# Patient Record
Sex: MALE | Race: WHITE | Employment: OTHER | URBAN - METROPOLITAN AREA
[De-identification: names, ages, dates, MRNs, and addresses within clinical notes are randomized per-mention and may not be internally consistent; named-entity substitution may affect disease eponyms.]

---

## 2017-04-20 ENCOUNTER — HOSPITAL ENCOUNTER (OUTPATIENT)
Dept: SURGERY | Age: 82
Discharge: HOME | End: 2017-04-20
Attending: INTERNAL MEDICINE | Admitting: INTERNAL MEDICINE

## 2017-04-20 VITALS
HEART RATE: 60 BPM | HEIGHT: 70 IN | RESPIRATION RATE: 18 BRPM | WEIGHT: 145 LBS | DIASTOLIC BLOOD PRESSURE: 63 MMHG | SYSTOLIC BLOOD PRESSURE: 134 MMHG | BODY MASS INDEX: 20.76 KG/M2 | OXYGEN SATURATION: 98 % | TEMPERATURE: 97.3 F

## 2017-04-20 RX ORDER — OXYCODONE HYDROCHLORIDE AND ACETAMINOPHEN 5; 325 MG/1; MG/1
1 TABLET ORAL PRN
Status: ACTIVE | OUTPATIENT
Start: 2017-04-20 | End: 2017-04-20

## 2017-04-20 RX ORDER — LIDOCAINE HYDROCHLORIDE 10 MG/ML
0.1 INJECTION, SOLUTION EPIDURAL; INFILTRATION; INTRACAUDAL; PERINEURAL
Status: ACTIVE | OUTPATIENT
Start: 2017-04-20 | End: 2017-04-20

## 2017-04-20 RX ORDER — ONDANSETRON 2 MG/ML
4 INJECTION INTRAMUSCULAR; INTRAVENOUS
Status: ACTIVE | OUTPATIENT
Start: 2017-04-20 | End: 2017-04-20

## 2017-04-20 RX ORDER — LABETALOL HYDROCHLORIDE 5 MG/ML
5 INJECTION, SOLUTION INTRAVENOUS EVERY 10 MIN PRN
Status: DISCONTINUED | OUTPATIENT
Start: 2017-04-20 | End: 2017-04-21 | Stop reason: HOSPADM

## 2017-04-20 RX ORDER — LANOLIN ALCOHOL/MO/W.PET/CERES
3 CREAM (GRAM) TOPICAL DAILY
COMMUNITY
End: 2018-08-03 | Stop reason: ALTCHOICE

## 2017-04-20 RX ORDER — MORPHINE SULFATE 2 MG/ML
1 INJECTION, SOLUTION INTRAMUSCULAR; INTRAVENOUS EVERY 5 MIN PRN
Status: DISCONTINUED | OUTPATIENT
Start: 2017-04-20 | End: 2017-04-21 | Stop reason: HOSPADM

## 2017-04-20 RX ORDER — MORPHINE SULFATE 10 MG/ML
2 INJECTION, SOLUTION INTRAMUSCULAR; INTRAVENOUS EVERY 5 MIN PRN
Status: DISCONTINUED | OUTPATIENT
Start: 2017-04-20 | End: 2017-04-21 | Stop reason: HOSPADM

## 2017-04-20 RX ORDER — PROMETHAZINE HYDROCHLORIDE 25 MG/ML
6.25 INJECTION, SOLUTION INTRAMUSCULAR; INTRAVENOUS
Status: ACTIVE | OUTPATIENT
Start: 2017-04-20 | End: 2017-04-20

## 2017-04-20 RX ORDER — HYDRALAZINE HYDROCHLORIDE 20 MG/ML
5 INJECTION INTRAMUSCULAR; INTRAVENOUS EVERY 10 MIN PRN
Status: DISCONTINUED | OUTPATIENT
Start: 2017-04-20 | End: 2017-04-21 | Stop reason: HOSPADM

## 2017-04-20 RX ORDER — MEPERIDINE HYDROCHLORIDE 50 MG/ML
12.5 INJECTION INTRAMUSCULAR; INTRAVENOUS; SUBCUTANEOUS EVERY 5 MIN PRN
Status: DISCONTINUED | OUTPATIENT
Start: 2017-04-20 | End: 2017-04-21 | Stop reason: HOSPADM

## 2017-04-20 RX ORDER — OXYCODONE HYDROCHLORIDE AND ACETAMINOPHEN 5; 325 MG/1; MG/1
2 TABLET ORAL PRN
Status: ACTIVE | OUTPATIENT
Start: 2017-04-20 | End: 2017-04-20

## 2017-04-20 RX ORDER — SODIUM CHLORIDE, SODIUM LACTATE, POTASSIUM CHLORIDE, CALCIUM CHLORIDE 600; 310; 30; 20 MG/100ML; MG/100ML; MG/100ML; MG/100ML
INJECTION, SOLUTION INTRAVENOUS ONCE
Status: COMPLETED | OUTPATIENT
Start: 2017-04-20 | End: 2017-04-20

## 2017-04-20 RX ORDER — FINASTERIDE 5 MG/1
5 TABLET, FILM COATED ORAL DAILY
Status: ON HOLD | COMMUNITY
End: 2021-03-03 | Stop reason: HOSPADM

## 2017-04-20 RX ORDER — SODIUM CHLORIDE, SODIUM LACTATE, POTASSIUM CHLORIDE, CALCIUM CHLORIDE 600; 310; 30; 20 MG/100ML; MG/100ML; MG/100ML; MG/100ML
INJECTION, SOLUTION INTRAVENOUS CONTINUOUS
Status: CANCELLED | OUTPATIENT
Start: 2017-04-20

## 2017-04-20 RX ORDER — DIPHENHYDRAMINE HYDROCHLORIDE 50 MG/ML
12.5 INJECTION INTRAMUSCULAR; INTRAVENOUS
Status: ACTIVE | OUTPATIENT
Start: 2017-04-20 | End: 2017-04-20

## 2017-04-20 RX ADMIN — SODIUM CHLORIDE, SODIUM LACTATE, POTASSIUM CHLORIDE, CALCIUM CHLORIDE: 600; 310; 30; 20 INJECTION, SOLUTION INTRAVENOUS at 09:58

## 2017-04-20 ASSESSMENT — PAIN SCALES - GENERAL
PAINLEVEL_OUTOF10: 0
PAINLEVEL_OUTOF10: 0

## 2017-04-20 ASSESSMENT — PAIN - FUNCTIONAL ASSESSMENT: PAIN_FUNCTIONAL_ASSESSMENT: 0-10

## 2017-05-25 ENCOUNTER — HOSPITAL ENCOUNTER (OUTPATIENT)
Dept: PHYSICAL THERAPY | Age: 82
Discharge: HOME | End: 2017-05-31
Admitting: INTERNAL MEDICINE

## 2018-08-03 ENCOUNTER — HOSPITAL ENCOUNTER (INPATIENT)
Age: 83
LOS: 4 days | Discharge: HOME | DRG: 189 | End: 2018-08-07
Attending: EMERGENCY MEDICINE | Admitting: FAMILY MEDICINE
Payer: MEDICARE

## 2018-08-03 ENCOUNTER — APPOINTMENT (OUTPATIENT)
Dept: GENERAL RADIOLOGY | Age: 83
DRG: 189 | End: 2018-08-03
Payer: MEDICARE

## 2018-08-03 ENCOUNTER — APPOINTMENT (OUTPATIENT)
Dept: CT IMAGING | Age: 83
DRG: 189 | End: 2018-08-03
Payer: MEDICARE

## 2018-08-03 DIAGNOSIS — J44.1 COPD EXACERBATION (HCC): ICD-10-CM

## 2018-08-03 DIAGNOSIS — J40 BRONCHITIS: ICD-10-CM

## 2018-08-03 DIAGNOSIS — R09.02 HYPOXIA: Primary | ICD-10-CM

## 2018-08-03 LAB
A/G RATIO: 1.4 (ref 1.1–2.2)
ALBUMIN SERPL-MCNC: 3.9 G/DL (ref 3.4–5)
ALP BLD-CCNC: 74 U/L (ref 40–129)
ALT SERPL-CCNC: 19 U/L (ref 10–40)
ANION GAP SERPL CALCULATED.3IONS-SCNC: 13 MMOL/L (ref 3–16)
AST SERPL-CCNC: 23 U/L (ref 15–37)
BASOPHILS ABSOLUTE: 0.1 K/UL (ref 0–0.2)
BASOPHILS RELATIVE PERCENT: 0.4 %
BILIRUB SERPL-MCNC: 3.3 MG/DL (ref 0–1)
BUN BLDV-MCNC: 16 MG/DL (ref 7–20)
CALCIUM SERPL-MCNC: 9.3 MG/DL (ref 8.3–10.6)
CHLORIDE BLD-SCNC: 101 MMOL/L (ref 99–110)
CO2: 22 MMOL/L (ref 21–32)
CREAT SERPL-MCNC: 1.1 MG/DL (ref 0.8–1.3)
EOSINOPHILS ABSOLUTE: 0.1 K/UL (ref 0–0.6)
EOSINOPHILS RELATIVE PERCENT: 0.4 %
GFR AFRICAN AMERICAN: >60
GFR NON-AFRICAN AMERICAN: >60
GLOBULIN: 2.8 G/DL
GLUCOSE BLD-MCNC: 149 MG/DL (ref 70–99)
HCT VFR BLD CALC: 43.7 % (ref 40.5–52.5)
HEMOGLOBIN: 15 G/DL (ref 13.5–17.5)
LACTIC ACID: 2.1 MMOL/L (ref 0.4–2)
LYMPHOCYTES ABSOLUTE: 0.5 K/UL (ref 1–5.1)
LYMPHOCYTES RELATIVE PERCENT: 3.6 %
MCH RBC QN AUTO: 34.2 PG (ref 26–34)
MCHC RBC AUTO-ENTMCNC: 34.3 G/DL (ref 31–36)
MCV RBC AUTO: 99.5 FL (ref 80–100)
MONOCYTES ABSOLUTE: 0.9 K/UL (ref 0–1.3)
MONOCYTES RELATIVE PERCENT: 6.6 %
NEUTROPHILS ABSOLUTE: 12.5 K/UL (ref 1.7–7.7)
NEUTROPHILS RELATIVE PERCENT: 89 %
PDW BLD-RTO: 14.1 % (ref 12.4–15.4)
PLATELET # BLD: 181 K/UL (ref 135–450)
PMV BLD AUTO: 8.3 FL (ref 5–10.5)
POTASSIUM SERPL-SCNC: 3.7 MMOL/L (ref 3.5–5.1)
PRO-BNP: 1044 PG/ML (ref 0–449)
RBC # BLD: 4.4 M/UL (ref 4.2–5.9)
SODIUM BLD-SCNC: 136 MMOL/L (ref 136–145)
TOTAL PROTEIN: 6.7 G/DL (ref 6.4–8.2)
TROPONIN: <0.01 NG/ML
WBC # BLD: 14 K/UL (ref 4–11)

## 2018-08-03 PROCEDURE — 93010 ELECTROCARDIOGRAM REPORT: CPT | Performed by: INTERNAL MEDICINE

## 2018-08-03 PROCEDURE — 83880 ASSAY OF NATRIURETIC PEPTIDE: CPT

## 2018-08-03 PROCEDURE — 2580000003 HC RX 258: Performed by: FAMILY MEDICINE

## 2018-08-03 PROCEDURE — 71046 X-RAY EXAM CHEST 2 VIEWS: CPT

## 2018-08-03 PROCEDURE — 83605 ASSAY OF LACTIC ACID: CPT

## 2018-08-03 PROCEDURE — 6370000000 HC RX 637 (ALT 250 FOR IP): Performed by: EMERGENCY MEDICINE

## 2018-08-03 PROCEDURE — 6370000000 HC RX 637 (ALT 250 FOR IP): Performed by: FAMILY MEDICINE

## 2018-08-03 PROCEDURE — 85025 COMPLETE CBC W/AUTO DIFF WBC: CPT

## 2018-08-03 PROCEDURE — 1200000000 HC SEMI PRIVATE

## 2018-08-03 PROCEDURE — 94150 VITAL CAPACITY TEST: CPT

## 2018-08-03 PROCEDURE — 2700000000 HC OXYGEN THERAPY PER DAY

## 2018-08-03 PROCEDURE — 99285 EMERGENCY DEPT VISIT HI MDM: CPT

## 2018-08-03 PROCEDURE — 94664 DEMO&/EVAL PT USE INHALER: CPT

## 2018-08-03 PROCEDURE — 87040 BLOOD CULTURE FOR BACTERIA: CPT

## 2018-08-03 PROCEDURE — 94761 N-INVAS EAR/PLS OXIMETRY MLT: CPT

## 2018-08-03 PROCEDURE — 94640 AIRWAY INHALATION TREATMENT: CPT

## 2018-08-03 PROCEDURE — 71250 CT THORAX DX C-: CPT

## 2018-08-03 PROCEDURE — 93005 ELECTROCARDIOGRAM TRACING: CPT | Performed by: EMERGENCY MEDICINE

## 2018-08-03 PROCEDURE — 84484 ASSAY OF TROPONIN QUANT: CPT

## 2018-08-03 PROCEDURE — 80053 COMPREHEN METABOLIC PANEL: CPT

## 2018-08-03 RX ORDER — FOLIC ACID 1 MG/1
1 TABLET ORAL DAILY
Status: DISCONTINUED | OUTPATIENT
Start: 2018-08-03 | End: 2018-08-07 | Stop reason: HOSPADM

## 2018-08-03 RX ORDER — PREDNISONE 20 MG/1
40 TABLET ORAL 2 TIMES DAILY
Status: DISCONTINUED | OUTPATIENT
Start: 2018-08-03 | End: 2018-08-03

## 2018-08-03 RX ORDER — SODIUM CHLORIDE 0.9 % (FLUSH) 0.9 %
10 SYRINGE (ML) INJECTION PRN
Status: DISCONTINUED | OUTPATIENT
Start: 2018-08-03 | End: 2018-08-07 | Stop reason: HOSPADM

## 2018-08-03 RX ORDER — ONDANSETRON 2 MG/ML
4 INJECTION INTRAMUSCULAR; INTRAVENOUS EVERY 6 HOURS PRN
Status: DISCONTINUED | OUTPATIENT
Start: 2018-08-03 | End: 2018-08-07 | Stop reason: HOSPADM

## 2018-08-03 RX ORDER — PREDNISONE 20 MG/1
40 TABLET ORAL DAILY
Status: DISCONTINUED | OUTPATIENT
Start: 2018-08-03 | End: 2018-08-05

## 2018-08-03 RX ORDER — LEVOTHYROXINE SODIUM 0.05 MG/1
50 TABLET ORAL DAILY
Status: DISCONTINUED | OUTPATIENT
Start: 2018-08-03 | End: 2018-08-07 | Stop reason: HOSPADM

## 2018-08-03 RX ORDER — AZITHROMYCIN 250 MG/1
500 TABLET, FILM COATED ORAL DAILY
Status: ACTIVE | OUTPATIENT
Start: 2018-08-03 | End: 2018-08-04

## 2018-08-03 RX ORDER — PREDNISONE 20 MG/1
60 TABLET ORAL ONCE
Status: COMPLETED | OUTPATIENT
Start: 2018-08-03 | End: 2018-08-03

## 2018-08-03 RX ORDER — FINASTERIDE 5 MG/1
5 TABLET, FILM COATED ORAL DAILY
Status: DISCONTINUED | OUTPATIENT
Start: 2018-08-03 | End: 2018-08-07 | Stop reason: HOSPADM

## 2018-08-03 RX ORDER — SODIUM CHLORIDE 0.9 % (FLUSH) 0.9 %
10 SYRINGE (ML) INJECTION EVERY 12 HOURS SCHEDULED
Status: DISCONTINUED | OUTPATIENT
Start: 2018-08-03 | End: 2018-08-07 | Stop reason: HOSPADM

## 2018-08-03 RX ORDER — TAMSULOSIN HYDROCHLORIDE 0.4 MG/1
0.4 CAPSULE ORAL DAILY
Status: DISCONTINUED | OUTPATIENT
Start: 2018-08-03 | End: 2018-08-07 | Stop reason: HOSPADM

## 2018-08-03 RX ORDER — AZITHROMYCIN 250 MG/1
250 TABLET, FILM COATED ORAL DAILY
Status: DISCONTINUED | OUTPATIENT
Start: 2018-08-04 | End: 2018-08-05

## 2018-08-03 RX ORDER — PREDNISONE 20 MG/1
40 TABLET ORAL 2 TIMES DAILY
Status: DISCONTINUED | OUTPATIENT
Start: 2018-08-04 | End: 2018-08-03

## 2018-08-03 RX ORDER — MIRTAZAPINE 30 MG/1
30 TABLET, FILM COATED ORAL NIGHTLY
Status: ON HOLD | COMMUNITY
End: 2021-03-03 | Stop reason: HOSPADM

## 2018-08-03 RX ORDER — POLYETHYLENE GLYCOL 3350 17 G/17G
17 POWDER, FOR SOLUTION ORAL DAILY
Status: ON HOLD | COMMUNITY
End: 2021-03-03 | Stop reason: HOSPADM

## 2018-08-03 RX ORDER — POLYETHYLENE GLYCOL 3350 17 G/17G
17 POWDER, FOR SOLUTION ORAL DAILY
Status: DISCONTINUED | OUTPATIENT
Start: 2018-08-03 | End: 2018-08-05

## 2018-08-03 RX ORDER — IPRATROPIUM BROMIDE AND ALBUTEROL SULFATE 2.5; .5 MG/3ML; MG/3ML
1 SOLUTION RESPIRATORY (INHALATION) EVERY 4 HOURS
Status: DISCONTINUED | OUTPATIENT
Start: 2018-08-04 | End: 2018-08-04

## 2018-08-03 RX ORDER — AZITHROMYCIN 250 MG/1
500 TABLET, FILM COATED ORAL ONCE
Status: COMPLETED | OUTPATIENT
Start: 2018-08-03 | End: 2018-08-03

## 2018-08-03 RX ORDER — IPRATROPIUM BROMIDE AND ALBUTEROL SULFATE 2.5; .5 MG/3ML; MG/3ML
1 SOLUTION RESPIRATORY (INHALATION) EVERY 4 HOURS PRN
Status: DISCONTINUED | OUTPATIENT
Start: 2018-08-03 | End: 2018-08-03

## 2018-08-03 RX ORDER — SILODOSIN 8 MG/1
8 CAPSULE ORAL EVERY EVENING
COMMUNITY
End: 2021-02-26

## 2018-08-03 RX ORDER — IPRATROPIUM BROMIDE AND ALBUTEROL SULFATE 2.5; .5 MG/3ML; MG/3ML
1 SOLUTION RESPIRATORY (INHALATION)
Status: DISCONTINUED | OUTPATIENT
Start: 2018-08-03 | End: 2018-08-03

## 2018-08-03 RX ORDER — MIRTAZAPINE 15 MG/1
30 TABLET, FILM COATED ORAL NIGHTLY
Status: DISCONTINUED | OUTPATIENT
Start: 2018-08-03 | End: 2018-08-07 | Stop reason: HOSPADM

## 2018-08-03 RX ORDER — CHOLECALCIFEROL (VITAMIN D3) 125 MCG
5 CAPSULE ORAL NIGHTLY PRN
Status: DISCONTINUED | OUTPATIENT
Start: 2018-08-03 | End: 2018-08-07 | Stop reason: HOSPADM

## 2018-08-03 RX ADMIN — Medication 10 ML: at 20:49

## 2018-08-03 RX ADMIN — MIRTAZAPINE 30 MG: 15 TABLET, FILM COATED ORAL at 20:48

## 2018-08-03 RX ADMIN — TAMSULOSIN HYDROCHLORIDE 0.4 MG: 0.4 CAPSULE ORAL at 20:49

## 2018-08-03 RX ADMIN — IPRATROPIUM BROMIDE AND ALBUTEROL SULFATE 1 AMPULE: .5; 3 SOLUTION RESPIRATORY (INHALATION) at 20:01

## 2018-08-03 RX ADMIN — IPRATROPIUM BROMIDE AND ALBUTEROL SULFATE 1 AMPULE: .5; 3 SOLUTION RESPIRATORY (INHALATION) at 23:31

## 2018-08-03 RX ADMIN — AZITHROMYCIN 500 MG: 250 TABLET, FILM COATED ORAL at 18:12

## 2018-08-03 RX ADMIN — PREDNISONE 40 MG: 20 TABLET ORAL at 20:49

## 2018-08-03 RX ADMIN — PREDNISONE 60 MG: 20 TABLET ORAL at 18:12

## 2018-08-03 ASSESSMENT — ENCOUNTER SYMPTOMS
SHORTNESS OF BREATH: 1
ABDOMINAL PAIN: 0
NAUSEA: 0
CHEST TIGHTNESS: 0
BACK PAIN: 0
COUGH: 1

## 2018-08-03 NOTE — H&P
Hospital Medicine History & Physical      PCP: Kamila Orta MD    Date of Admission: 8/3/2018    Date of Service: Pt seen/examined on 8/3/18 and Admitted to Inpatient with expected LOS greater than two midnights due to medical therapy. Chief Complaint:  SOB    History Of Present Illness:    80 y.o. male who presented to [facility] with SOB. Patient notes that is become more short of breath over the past few days. He has a cough productive of sputum but difficulty expectorating. No fevers or chills. Increased weakness per daughter. She did not know that he had a diagnosis of emphysema or COPD. He notes he smoked for 20 year period but quit. He required oxygen in the ER upon arrival due to hypoxia. He is feeling a little better after a breathing treatment. He will be admitted for further care for a COPD exacerbation. Past Medical History:          Diagnosis Date    Decreased vision     left    Dysphagia     Enlarged prostate     Glaucoma     Macular degeneration     Thyroid disease     hypothyroid       Past Surgical History:          Procedure Laterality Date    BACK SURGERY      insertion of cement due to crushed vertebrae    EYE SURGERY Bilateral     cataracts    EYE SURGERY Right 6/13/14    [place]    TONSILLECTOMY      UPPER GASTROINTESTINAL ENDOSCOPY  04/20/2017    dilated esophagus       Medications Prior to Admission:      Prior to Admission medications    Medication Sig Start Date End Date Taking?  Authorizing Provider   polyethylene glycol (MIRALAX) powder Take 17 g by mouth daily   Yes Historical Provider, MD   mirtazapine (REMERON) 30 MG tablet Take 30 mg by mouth nightly   Yes Historical Provider, MD   silodosin (RAPAFLO) 8 MG CAPS Take 8 mg by mouth every evening   Yes Historical Provider, MD   finasteride (PROSCAR) 5 MG tablet Take 5 mg by mouth daily   Yes Historical Provider, MD   Cyanocobalamin (VITAMIN B 12 PO) Take by mouth   Yes Historical Provider, MD vitamin D (CHOLECALCIFEROL) 1000 UNIT TABS tablet Take 1,000 Units by mouth daily   Yes Historical Provider, MD   folic acid (FOLVITE) 1 MG tablet Take 1 mg by mouth daily. Yes Historical Provider, MD   Levothyroxine Sodium 50 MCG CAPS Take  by mouth. Yes Historical Provider, MD       Allergies:  Patient has no known allergies. Social History:      The patient currently lives at assisted living. TOBACCO:   reports that he quit smoking about 23 years ago. He has a 40.00 pack-year smoking history. He has never used smokeless tobacco.  ETOH:   reports that he drinks about 3.5 oz of alcohol per week . Family History:      Positive as follows:    Family History   Problem Relation Age of Onset   Tania Villagran Cancer Mother     Cancer Father        REVIEW OF SYSTEMS:   Pertinent positives as noted in the HPI. All other systems reviewed and negative. PHYSICAL EXAM PERFORMED:    /80   Pulse 88   Temp 98.2 °F (36.8 °C) (Oral)   Resp 18   Ht 5' 11\" (1.803 m)   Wt 155 lb (70.3 kg)   SpO2 92%   BMI 21.62 kg/m²     General appearance:  No apparent distress. HEENT:  Bilateral Blindness. Neck: Supple. Respiratory:  Decreased breath sounds at bases. Cardiovascular:  Regular rate and rhythm with normal S1/S2 without murmurs, rubs or gallops. Abdomen: Soft, non-tender, non-distended with normal bowel sounds. Musculoskeletal:  Generalized weakness. Skin: Skin color, texture, turgor normal.  No rashes or lesions. Neurologic:  Neurovascularly intact without any focal sensory/motor deficits.  Cranial nerves: II-XII intact, grossly non-focal.  Psychiatric:  Alert and oriented, thought content appropriate, normal insight  Capillary Refill: Brisk,< 3 seconds   Peripheral Pulses: +2 palpable, equal bilaterally       Labs:     Recent Labs      08/03/18   1640   WBC  14.0*   HGB  15.0   HCT  43.7   PLT  181     Recent Labs      08/03/18   1640   NA  136   K  3.7   CL  101   CO2  22   BUN  16   CREATININE  1.1 CALCIUM  9.3     Recent Labs      08/03/18   1640   AST  23   ALT  19   BILITOT  3.3*   ALKPHOS  74     No results for input(s): INR in the last 72 hours. Recent Labs      08/03/18   1640   TROPONINI  <0.01       Urinalysis:      Lab Results   Component Value Date    NITRU Negative 04/23/2016    BLOODU Negative 04/23/2016    SPECGRAV 1.015 04/23/2016    GLUCOSEU Negative 04/23/2016       Radiology:     CXR: I have reviewed the CXR with the following interpretation: neg  EKG:  I have reviewed the EKG with the following interpretation: bigeminy    CT CHEST WO CONTRAST   Final Result   Moderate emphysema and COPD with old granulomatous disease. No evident   pneumonia or congestive heart failure. Mild peribronchial wall thickening in all lung segments could represent acute   or chronic bronchitis versus mild bronchiectasis. Old 30% anterior wedge compression fracture near the thoracolumbar junction. XR CHEST STANDARD (2 VW)   Final Result   1. No acute abnormality. ASSESSMENT:    Active Hospital Problems    Diagnosis Date Noted    COPD exacerbation ([facility] [address] [J44.1] 08/03/2018     PLAN:    1. COPD exacerbation with hypoxia-pt not normally on oxygen but requiring 3 L in the ER. Notes cough productive of sputum. CT shows mod emphysema which is new to the patient. No pneumonia or edema. Started on steroids, breathing treatments, azithromycin. Pro calcitonin pending. 2.  BPHcontinue Proscar and Flomax. 3.  Hypothyroidismcontinue Synthroid. Stable. DVT Prophylaxis: lovenox  Diet: DIET GENERAL;  Code Status: Full Code    PT/OT Eval Status: baseline    Dispo - 2-3 days       Jia Glover MD    Thank you Maxime De Dios MD for the opportunity to be involved in this patient's care. If you have any questions or concerns please feel free to contact me at [phone].

## 2018-08-04 LAB
ANION GAP SERPL CALCULATED.3IONS-SCNC: 16 MMOL/L (ref 3–16)
BASOPHILS ABSOLUTE: 0 K/UL (ref 0–0.2)
BASOPHILS RELATIVE PERCENT: 0.2 %
BUN BLDV-MCNC: 21 MG/DL (ref 7–20)
CALCIUM SERPL-MCNC: 9.2 MG/DL (ref 8.3–10.6)
CHLORIDE BLD-SCNC: 104 MMOL/L (ref 99–110)
CO2: 19 MMOL/L (ref 21–32)
CREAT SERPL-MCNC: 1.2 MG/DL (ref 0.8–1.3)
EOSINOPHILS ABSOLUTE: 0 K/UL (ref 0–0.6)
EOSINOPHILS RELATIVE PERCENT: 0 %
GFR AFRICAN AMERICAN: >60
GFR NON-AFRICAN AMERICAN: 57
GLUCOSE BLD-MCNC: 207 MG/DL (ref 70–99)
HCT VFR BLD CALC: 41.9 % (ref 40.5–52.5)
HEMOGLOBIN: 14.3 G/DL (ref 13.5–17.5)
LYMPHOCYTES ABSOLUTE: 0.2 K/UL (ref 1–5.1)
LYMPHOCYTES RELATIVE PERCENT: 2.4 %
MAGNESIUM: 1.9 MG/DL (ref 1.8–2.4)
MCH RBC QN AUTO: 34.2 PG (ref 26–34)
MCHC RBC AUTO-ENTMCNC: 34.2 G/DL (ref 31–36)
MCV RBC AUTO: 100.1 FL (ref 80–100)
MONOCYTES ABSOLUTE: 0.2 K/UL (ref 0–1.3)
MONOCYTES RELATIVE PERCENT: 2 %
NEUTROPHILS ABSOLUTE: 8.4 K/UL (ref 1.7–7.7)
NEUTROPHILS RELATIVE PERCENT: 95.4 %
PDW BLD-RTO: 13.9 % (ref 12.4–15.4)
PLATELET # BLD: 163 K/UL (ref 135–450)
PMV BLD AUTO: 8.4 FL (ref 5–10.5)
POTASSIUM REFLEX MAGNESIUM: 3.3 MMOL/L (ref 3.5–5.1)
PROCALCITONIN: 0.25 NG/ML (ref 0–0.15)
RBC # BLD: 4.18 M/UL (ref 4.2–5.9)
SODIUM BLD-SCNC: 139 MMOL/L (ref 136–145)
WBC # BLD: 8.8 K/UL (ref 4–11)

## 2018-08-04 PROCEDURE — 85025 COMPLETE CBC W/AUTO DIFF WBC: CPT

## 2018-08-04 PROCEDURE — 6370000000 HC RX 637 (ALT 250 FOR IP): Performed by: FAMILY MEDICINE

## 2018-08-04 PROCEDURE — 51702 INSERT TEMP BLADDER CATH: CPT

## 2018-08-04 PROCEDURE — 94640 AIRWAY INHALATION TREATMENT: CPT

## 2018-08-04 PROCEDURE — 94761 N-INVAS EAR/PLS OXIMETRY MLT: CPT

## 2018-08-04 PROCEDURE — 51798 US URINE CAPACITY MEASURE: CPT

## 2018-08-04 PROCEDURE — 2580000003 HC RX 258: Performed by: FAMILY MEDICINE

## 2018-08-04 PROCEDURE — 1200000000 HC SEMI PRIVATE

## 2018-08-04 PROCEDURE — 80048 BASIC METABOLIC PNL TOTAL CA: CPT

## 2018-08-04 PROCEDURE — 6360000002 HC RX W HCPCS: Performed by: FAMILY MEDICINE

## 2018-08-04 PROCEDURE — 36415 COLL VENOUS BLD VENIPUNCTURE: CPT

## 2018-08-04 PROCEDURE — 2580000003 HC RX 258: Performed by: INTERNAL MEDICINE

## 2018-08-04 PROCEDURE — 2700000000 HC OXYGEN THERAPY PER DAY

## 2018-08-04 PROCEDURE — 83735 ASSAY OF MAGNESIUM: CPT

## 2018-08-04 PROCEDURE — 6370000000 HC RX 637 (ALT 250 FOR IP): Performed by: INTERNAL MEDICINE

## 2018-08-04 PROCEDURE — 84145 PROCALCITONIN (PCT): CPT

## 2018-08-04 RX ORDER — IPRATROPIUM BROMIDE AND ALBUTEROL SULFATE 2.5; .5 MG/3ML; MG/3ML
1 SOLUTION RESPIRATORY (INHALATION) 2 TIMES DAILY
Status: DISCONTINUED | OUTPATIENT
Start: 2018-08-05 | End: 2018-08-05 | Stop reason: ALTCHOICE

## 2018-08-04 RX ORDER — 0.9 % SODIUM CHLORIDE 0.9 %
500 INTRAVENOUS SOLUTION INTRAVENOUS ONCE
Status: COMPLETED | OUTPATIENT
Start: 2018-08-04 | End: 2018-08-04

## 2018-08-04 RX ORDER — IPRATROPIUM BROMIDE AND ALBUTEROL SULFATE 2.5; .5 MG/3ML; MG/3ML
1 SOLUTION RESPIRATORY (INHALATION) EVERY 4 HOURS PRN
Status: DISCONTINUED | OUTPATIENT
Start: 2018-08-04 | End: 2018-08-07 | Stop reason: HOSPADM

## 2018-08-04 RX ORDER — POTASSIUM CHLORIDE 20 MEQ/1
40 TABLET, EXTENDED RELEASE ORAL ONCE
Status: COMPLETED | OUTPATIENT
Start: 2018-08-04 | End: 2018-08-04

## 2018-08-04 RX ADMIN — FINASTERIDE 5 MG: 5 TABLET, FILM COATED ORAL at 10:32

## 2018-08-04 RX ADMIN — MIRTAZAPINE 30 MG: 15 TABLET, FILM COATED ORAL at 21:20

## 2018-08-04 RX ADMIN — LEVOTHYROXINE SODIUM 50 MCG: 50 TABLET ORAL at 10:31

## 2018-08-04 RX ADMIN — PREDNISONE 40 MG: 20 TABLET ORAL at 10:32

## 2018-08-04 RX ADMIN — IPRATROPIUM BROMIDE AND ALBUTEROL SULFATE 1 AMPULE: .5; 3 SOLUTION RESPIRATORY (INHALATION) at 08:49

## 2018-08-04 RX ADMIN — IPRATROPIUM BROMIDE AND ALBUTEROL SULFATE 1 AMPULE: .5; 3 SOLUTION RESPIRATORY (INHALATION) at 03:55

## 2018-08-04 RX ADMIN — POTASSIUM CHLORIDE 40 MEQ: 20 TABLET, EXTENDED RELEASE ORAL at 10:29

## 2018-08-04 RX ADMIN — ENOXAPARIN SODIUM 40 MG: 100 INJECTION SUBCUTANEOUS at 10:38

## 2018-08-04 RX ADMIN — SODIUM CHLORIDE 500 ML: 900 INJECTION INTRAVENOUS at 16:59

## 2018-08-04 RX ADMIN — AZITHROMYCIN 250 MG: 250 TABLET, FILM COATED ORAL at 10:31

## 2018-08-04 RX ADMIN — Medication 10 ML: at 10:39

## 2018-08-04 RX ADMIN — TAMSULOSIN HYDROCHLORIDE 0.4 MG: 0.4 CAPSULE ORAL at 10:31

## 2018-08-04 RX ADMIN — Medication 10 ML: at 21:20

## 2018-08-04 RX ADMIN — IPRATROPIUM BROMIDE AND ALBUTEROL SULFATE 1 AMPULE: .5; 3 SOLUTION RESPIRATORY (INHALATION) at 15:35

## 2018-08-04 RX ADMIN — IPRATROPIUM BROMIDE AND ALBUTEROL SULFATE 1 AMPULE: .5; 3 SOLUTION RESPIRATORY (INHALATION) at 20:18

## 2018-08-04 RX ADMIN — FOLIC ACID 1 MG: 1 TABLET ORAL at 10:32

## 2018-08-04 RX ADMIN — IPRATROPIUM BROMIDE AND ALBUTEROL SULFATE 1 AMPULE: .5; 3 SOLUTION RESPIRATORY (INHALATION) at 11:42

## 2018-08-04 ASSESSMENT — PAIN SCALES - GENERAL
PAINLEVEL_OUTOF10: 0
PAINLEVEL_OUTOF10: 0

## 2018-08-04 NOTE — PLAN OF CARE
Problem: Falls - Risk of:  Goal: Absence of physical injury  Absence of physical injury   Outcome: Ongoing  Bed locked, in lowest position. Non skid socks on while OOB. Bed/chair alarm in place and active, call light and personal belongings kept within reach at all times.

## 2018-08-04 NOTE — ED PROVIDER NOTES
of Diagnoses or Management Options  Bronchitis:   COPD exacerbation ([facility] [place] [address]): Hypoxia:   Diagnosis management comments: 66-year-old male with hypoxia and evidence of COPD on CT scan. He does have a change in cough benefit from antibiotic therapy in spite of no current evidence of pneumonia. He'll benefit from admission due to hypoxia pending improvement in his general respiratory status. Discussed case with hospitalist Dr. Denson Settler who agrees with this plan. He did receive steroids nebulizers and supplemental oxygen and azithromycin. Labs    Labs reviewed, show mild leukocytosis and elevated BNP. Radiology    CT of the chest and chest x-ray show no evidence of pneumonia. There is evidence of emphysema and granulomatous change. EKG Interpretation. EKG shows sinus rhythm with atrial bigeminy at a rate of 84. Normal axis. . No acute appearing ST-T wave changes. This EKG with this compared with EKG from June 13, 2014. Only change is new atrial bigeminy.      Jody Washington, DO  08/03/18 [address]  1959 [facility], DO  09/07/18 [id number]

## 2018-08-04 NOTE — PROGRESS NOTES
08/04/18 1145   Oxygen Therapy/Pulse Ox   O2 Therapy Oxygen   O2 Device Nasal cannula   Resp 16   O2 Flow Rate (L/min) 1 L/min   SpO2 96 %   Treatment   Treatment Type HHN   Medications Albuterol/Ipratropium   Pre-Tx Pulse 73   Pre-Tx Resps 16   Breath Sounds Pre-Tx ELODIA Clear   Breath Sounds Pre-Tx LLL Clear   Breath Sounds Pre-Tx RUL Clear   Breath Sounds Pre-Tx RML Clear   Breath Sounds Pre-Tx RLL Clear   Breath Sounds Post-Tx ELODIA Clear   Breath Sounds Post-Tx LLL Clear   Breath Sounds Post-Tx RUL Clear   Breath Sounds Post-Tx RML Clear   Breath Sounds Post-Tx RLL Clear   Post-Tx Pulse 73   Post-Tx Resps 16   Delivery Source Air   Position Semi-[employer]   Tx Tolerance Well     This patient is no distress. BBS are clear.  96% on 1lpm  Duoneb Q4 is not indicated

## 2018-08-04 NOTE — PROGRESS NOTES
Nebulizer [facility]) to patients when ANY of the following criteria are met  a. Incognizant or uncooperative          b. Patients treated with HHN at Home        c. Unable to demonstrate proper use of MDI with spacer     d. RR > 30 bpm   5. Bronchodilators will be delivered via Metered Dose Inhaler (MDI), HHN, Aerogen to intubated patients on mechanical ventilation. 6. Inhalation medication orders will be delivered and/or substituted as outlined below. Aerosolized Medications Ordering and Administration Guidelines:    1. All Medications will be ordered by a physician, and their frequency and/or modality will be adjusted as defined by the patients Respiratory Severity Index (RSI) score. 2. If the patient does not have documented COPD, consider discontinuing anticholinergics when RSI is less than 9.  3. If the bronchospasm worsens (increased RSI), then the bronchodilator frequency can be increased to a maximum of every 4 hours. If greater than every 4 hours is required, the physician will be contacted. 4. If the bronchospasm improves, the frequency of the bronchodilator can be decreased, based on the patient's RSI, but not less than home treatment regimen frequency. 5. Bronchodilator(s) will be discontinued if patient has a RSI less than 9 and has received no scheduled or as needed treatment for 72  Hrs. Patients Ordered on a Mucolytic Agent:    1. Must always be administered with a bronchodilator. 2. Discontinue if patient experiences worsened bronchospasm, or secretions have lessened to the point that the patient is able to clear them with a cough. Anti-inflammatory and Combination Medications:    1. If the patient lacks prior history of lung disease, is not using inhaled anti-inflammatory medication at home, and lacks wheezing by examination or by history for at least 24 hours, contact physician for possible discontinuation.

## 2018-08-04 NOTE — CONSULTS
Component Value Date    HGB 14.3 08/04/2018    HCT 41.9 08/04/2018     BMP:  Lab Results   Component Value Date     08/04/2018    K 3.3 08/04/2018     08/04/2018    CO2 19 08/04/2018    BUN 21 08/04/2018    LABALBU 3.9 08/03/2018    CREATININE 1.2 08/04/2018    CALCIUM 9.2 08/04/2018    GFRAA >60 08/04/2018    LABGLOM 57 08/04/2018     PT/INR:  No results found for: PROTIME, INR  PTT:  No results found for: APTT[APTT    Urinalysis:     Urine Culture:     Blood Culture:     Antibiotic Therapy:     Imaging:     Impression/Plan: pt has bph on rapaflo at home has urinary retention with preston now in place. There was some trouble placing straight catheter with some trauma and bleeding.  Continue preston for now, will give voiding trial prior to his d/c home    [facility]

## 2018-08-04 NOTE — PROGRESS NOTES
Pts HR reaching up to 180's while ambulating to BR. HR now maintaining 110-130's with frequent PVC's. MD messaged, awaiting response. All other VSS. SpO2 92-94%, O2 weaned from 3L down to 1L since this morning. Pt tolerating well. Bed alarm remains active. Family at bedside. Will continue to monitor.

## 2018-08-04 NOTE — PROGRESS NOTES
Hospitalist Progress Note      PCP: Rhea Ramos MD    Date of Admission: 8/3/2018    Chief Complaint:  SOB    Hospital Course:      Subjective: Patient is a poor historian. Currently he does not have any shortness of breath cough sputum or change in mental status. He did have  urinary retention and currently he has a Gabriel . Gabriel insertion was difficult and complicated with bleeding urology consult was called last night      Medications:  Reviewed    Infusion Medications   Scheduled Medications    finasteride  5 mg Oral Daily    folic acid  1 mg Oral Daily    levothyroxine  50 mcg Oral Daily    mirtazapine  30 mg Oral Nightly    polyethylene glycol  17 g Oral Daily    tamsulosin  0.4 mg Oral Daily    sodium chloride flush  10 mL Intravenous 2 times per day    enoxaparin  40 mg Subcutaneous Daily    azithromycin  500 mg Oral Daily    Followed by   Anh Walters azithromycin  250 mg Oral Daily    predniSONE  40 mg Oral Daily    ipratropium-albuterol  1 ampule Inhalation Q4H     PRN Meds: sodium chloride flush, magnesium hydroxide, ondansetron, melatonin      Intake/Output Summary (Last 24 hours) at 08/04/18 0730  Last data filed at 08/04/18 0450   Gross per 24 hour   Intake              100 ml   Output              350 ml   Net             -250 ml       Physical Exam Performed:    /82   Pulse 84   Temp 97.9 °F (36.6 °C) (Oral)   Resp 18   Ht 5' 11\" (1.803 m)   Wt 155 lb (70.3 kg)   SpO2 97%   BMI 21.62 kg/m²     General appearance: No apparent distress, appears stated age and cooperative. Frail, on oxygen  HEENT: Pupils equal, round, and reactive to light. Conjunctivae/corneas clear. Neck: Supple, with full range of motion. No jugular venous distention. Trachea midline. Respiratory:  Normal respiratory effort. Reduced air entry bilaterally without Rales/Wheezes/Rhonchi. Cardiovascular: Regular rate and rhythm with normal S1/S2 without murmurs, rubs or gallops.   Abdomen: Soft, non-tender, azithromycin. Pro calcitonin pending. Wean oxygen     2. BPHComplicated with urinary retentioncontinue Proscar and Flomax. And currently has Gabriel, urology consulted     3. Hypothyroidismcontinue Synthroid. Stable. 4 history of compression fracture of thoracolumbar region-chronic back pain which is controlled    5. Hypokalemia-replete    6.   Hypoxic respiratory failure-secondary to COPD exacerbation-wean oxygen      DVT Prophylaxis: Lovenox-  Diet: DIET GENERAL;  Code Status: Full Code    PT/OT Eval Status: Normally wheelchair-bound    Dispo - 2 days    Ra Velasco MD

## 2018-08-05 PROCEDURE — 2580000003 HC RX 258

## 2018-08-05 PROCEDURE — C9113 INJ PANTOPRAZOLE SODIUM, VIA: HCPCS | Performed by: INTERNAL MEDICINE

## 2018-08-05 PROCEDURE — 6360000002 HC RX W HCPCS: Performed by: FAMILY MEDICINE

## 2018-08-05 PROCEDURE — 6370000000 HC RX 637 (ALT 250 FOR IP): Performed by: FAMILY MEDICINE

## 2018-08-05 PROCEDURE — 6360000002 HC RX W HCPCS: Performed by: INTERNAL MEDICINE

## 2018-08-05 PROCEDURE — 1200000000 HC SEMI PRIVATE

## 2018-08-05 PROCEDURE — 94761 N-INVAS EAR/PLS OXIMETRY MLT: CPT

## 2018-08-05 PROCEDURE — 94640 AIRWAY INHALATION TREATMENT: CPT

## 2018-08-05 PROCEDURE — 2580000003 HC RX 258: Performed by: FAMILY MEDICINE

## 2018-08-05 PROCEDURE — 2580000003 HC RX 258: Performed by: INTERNAL MEDICINE

## 2018-08-05 PROCEDURE — 6370000000 HC RX 637 (ALT 250 FOR IP): Performed by: NURSE PRACTITIONER

## 2018-08-05 PROCEDURE — 94664 DEMO&/EVAL PT USE INHALER: CPT

## 2018-08-05 PROCEDURE — 6370000000 HC RX 637 (ALT 250 FOR IP): Performed by: INTERNAL MEDICINE

## 2018-08-05 PROCEDURE — 2700000000 HC OXYGEN THERAPY PER DAY

## 2018-08-05 PROCEDURE — 2580000003 HC RX 258: Performed by: NURSE PRACTITIONER

## 2018-08-05 RX ORDER — MAGNESIUM HYDROXIDE/ALUMINUM HYDROXICE/SIMETHICONE 120; 1200; 1200 MG/30ML; MG/30ML; MG/30ML
30 SUSPENSION ORAL 3 TIMES DAILY PRN
Status: DISCONTINUED | OUTPATIENT
Start: 2018-08-05 | End: 2018-08-07 | Stop reason: HOSPADM

## 2018-08-05 RX ORDER — 0.9 % SODIUM CHLORIDE 0.9 %
500 INTRAVENOUS SOLUTION INTRAVENOUS ONCE
Status: COMPLETED | OUTPATIENT
Start: 2018-08-05 | End: 2018-08-05

## 2018-08-05 RX ORDER — PANTOPRAZOLE SODIUM 40 MG/10ML
40 INJECTION, POWDER, LYOPHILIZED, FOR SOLUTION INTRAVENOUS DAILY
Status: DISCONTINUED | OUTPATIENT
Start: 2018-08-05 | End: 2018-08-07 | Stop reason: HOSPADM

## 2018-08-05 RX ORDER — SODIUM CHLORIDE 9 MG/ML
INJECTION, SOLUTION INTRAVENOUS
Status: COMPLETED
Start: 2018-08-05 | End: 2018-08-05

## 2018-08-05 RX ORDER — POTASSIUM CHLORIDE 20 MEQ/1
20 TABLET, EXTENDED RELEASE ORAL ONCE
Status: COMPLETED | OUTPATIENT
Start: 2018-08-05 | End: 2018-08-05

## 2018-08-05 RX ORDER — METHYLPREDNISOLONE SODIUM SUCCINATE 40 MG/ML
40 INJECTION, POWDER, LYOPHILIZED, FOR SOLUTION INTRAMUSCULAR; INTRAVENOUS EVERY 12 HOURS
Status: DISCONTINUED | OUTPATIENT
Start: 2018-08-05 | End: 2018-08-07 | Stop reason: HOSPADM

## 2018-08-05 RX ADMIN — Medication 10 ML: at 20:43

## 2018-08-05 RX ADMIN — Medication 10 ML: at 07:42

## 2018-08-05 RX ADMIN — METHYLPREDNISOLONE SODIUM SUCCINATE 40 MG: 40 INJECTION, POWDER, FOR SOLUTION INTRAMUSCULAR; INTRAVENOUS at 09:50

## 2018-08-05 RX ADMIN — MIRTAZAPINE 30 MG: 15 TABLET, FILM COATED ORAL at 20:43

## 2018-08-05 RX ADMIN — METHYLPREDNISOLONE SODIUM SUCCINATE 40 MG: 40 INJECTION, POWDER, FOR SOLUTION INTRAMUSCULAR; INTRAVENOUS at 20:42

## 2018-08-05 RX ADMIN — POTASSIUM CHLORIDE 20 MEQ: 20 TABLET, EXTENDED RELEASE ORAL at 14:26

## 2018-08-05 RX ADMIN — Medication 5 MG: at 20:48

## 2018-08-05 RX ADMIN — SODIUM CHLORIDE 500 ML: 900 INJECTION INTRAVENOUS at 02:08

## 2018-08-05 RX ADMIN — SODIUM CHLORIDE 500 ML: 9 INJECTION, SOLUTION INTRAVENOUS at 09:50

## 2018-08-05 RX ADMIN — Medication 2 PUFF: at 20:22

## 2018-08-05 RX ADMIN — ONDANSETRON 4 MG: 2 INJECTION, SOLUTION INTRAMUSCULAR; INTRAVENOUS at 07:42

## 2018-08-05 RX ADMIN — CHLORASEPTIC 1 SPRAY: 1.5 LIQUID ORAL at 01:30

## 2018-08-05 RX ADMIN — PANTOPRAZOLE SODIUM 40 MG: 40 INJECTION, POWDER, FOR SOLUTION INTRAVENOUS at 09:50

## 2018-08-05 RX ADMIN — AZITHROMYCIN MONOHYDRATE 250 MG: 500 INJECTION, POWDER, LYOPHILIZED, FOR SOLUTION INTRAVENOUS at 09:58

## 2018-08-05 NOTE — PROGRESS NOTES
Shift assessment completed and charted. VSS. Breathing improved. Lungs clear but diminished. 1L NC, stating around 92-93%, no home o2. Urine output low, catheter draining dark chelsey urine, bolus still infusing. Bed alarm on for pt safety. Bed locked and in lowest position. Call light within reach. Pt denies any other needs at this time. Will continue to monitor.

## 2018-08-05 NOTE — PROGRESS NOTES
Urology Attending Progress Note      Subjective: c/o heartburn    Vitals:  /64   Pulse 102   Temp 98.2 °F (36.8 °C) (Oral)   Resp 16   Ht 5' 11\" (1.803 m)   Wt 155 lb (70.3 kg)   SpO2 93%   BMI 21.62 kg/m²   Temp  Av.8 °F (36.6 °C)  Min: 97.4 °F (36.3 °C)  Max: 98.2 °F (36.8 °C)    Intake/Output Summary (Last 24 hours) at 18 0713  Last data filed at 18 0431   Gross per 24 hour   Intake             1998 ml   Output              350 ml   Net             1648 ml       Exam: urine clear in preston    Labs:  WBC:    Lab Results   Component Value Date    WBC 8.8 2018     Hemoglobin/Hematocrit:  Lab Results   Component Value Date    HGB 14.3 2018    HCT 41.9 2018     BMP:  Lab Results   Component Value Date     2018    K 3.3 2018     2018    CO2 19 2018    BUN 21 2018    LABALBU 3.9 2018    CREATININE 1.2 2018    CALCIUM 9.2 2018    GFRAA >60 2018    LABGLOM 57 2018     PT/INR:  No results found for: PROTIME, INR  PTT:  No results found for: APTT[APTT    Urinalysis:     Urine Culture:      Blood Culture:      Antibiotic Therapy:      Imaging:       Impression/Plan: no issues with preston urine clear low output overnight, creatinine is 1.2 , continue preston ,voiding trial prior to d/c home    [facility]

## 2018-08-05 NOTE — PROGRESS NOTES
Wheezes/Rhonchi. Cardiovascular: Regular rate and rhythm with normal S1/S2 without murmurs, rubs or gallops. Abdomen: Soft, non-tender, non-distended with normal bowel sounds. Musculoskeletal: No clubbing, cyanosis or edema bilaterally. Full range of motion without deformity. Skin: Skin color, texture, turgor normal.  No rashes or lesions. Neurologic:  Neurovascularly intact without any focal sensory/motor deficits. Cranial nerves: II-XII intact, grossly non-focal.  Blind  Psychiatric: Alert and oriented, forgetful  Capillary Refill: Brisk,< 3 seconds   Peripheral Pulses: +2 palpable, equal bilaterally       Labs:   Recent Labs      08/03/18   1640  08/04/18   0557   WBC  14.0*  8.8   HGB  15.0  14.3   HCT  43.7  41.9   PLT  181  163     Recent Labs      08/03/18   1640  08/04/18   0557   NA  136  139   K  3.7  3.3*   CL  101  104   CO2  22  19*   BUN  16  21*   CREATININE  1.1  1.2   CALCIUM  9.3  9.2     Recent Labs      08/03/18   1640   AST  23   ALT  19   BILITOT  3.3*   ALKPHOS  74     No results for input(s): INR in the last 72 hours. 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 No evident   pneumonia or congestive heart failure. Mild peribronchial wall thickening in all lung segments could represent acute   or chronic bronchitis versus mild bronchiectasis. Old 30% anterior wedge compression fracture near the thoracolumbar junction. XR CHEST STANDARD (2 VW)   Final Result   1. No acute abnormality. Assessment/Plan:    Active Hospital Problems    Diagnosis Date Noted    COPD exacerbation ([facility] [address] [J44.1] 08/03/2018     1.  COPD exacerbation with hypoxia-pt not normally on oxygen but requiring  2 L . CT shows mod emphysema which is new to the patient. No pneumonia or edema. Started on steroids, breathing treatments, azithromycin. improving. Wean oxygen, out of bed to chair, incentive spirometry, started on routine inhalers     2. BPHComplicated with urinary retentioncontinue Proscar and Flomax. And currently has Gabriel, urology consulted, input appreciated voiding trial before DC     3. Hypothyroidismcontinue Synthroid. Stable. 4 history of compression fracture of thoracolumbar region-chronic back pain which is controlled    5. Hypokalemia-replete    6. Hypoxic respiratory failure-secondary to COPD exacerbation-wean oxygen    Acute respiratory failure with hypoxia present on arrival in the setting of hypoxia with oxygen saturations 88% on room air, and COPD exacerbation being treated with supplemental oxygen, steroids, breathing treatments and supportive care  7 gastroesophageal regurgitation with possible acute gastritis- is probably induced by steroid- patient is advise on low frequent bland diet, IV Protonix changed to Zithromax to IV  8.   Loose stool-most probably secondary to MiraLAX-hold MiraLAX and if continues do stool workup including Clostridium difficile toxin      DVT Prophylaxis: Lovenox-  Diet: DIET GENERAL;  Code Status: Full Code    PT/OT Eval Status: Normally wheelchair-bound    Dispo - 2 days    Ryan Zavala MD

## 2018-08-06 PROCEDURE — 2580000003 HC RX 258

## 2018-08-06 PROCEDURE — 97116 GAIT TRAINING THERAPY: CPT

## 2018-08-06 PROCEDURE — 2580000003 HC RX 258: Performed by: FAMILY MEDICINE

## 2018-08-06 PROCEDURE — 6370000000 HC RX 637 (ALT 250 FOR IP): Performed by: INTERNAL MEDICINE

## 2018-08-06 PROCEDURE — G8979 MOBILITY GOAL STATUS: HCPCS

## 2018-08-06 PROCEDURE — 94640 AIRWAY INHALATION TREATMENT: CPT

## 2018-08-06 PROCEDURE — 97535 SELF CARE MNGMENT TRAINING: CPT

## 2018-08-06 PROCEDURE — 94761 N-INVAS EAR/PLS OXIMETRY MLT: CPT

## 2018-08-06 PROCEDURE — 97161 PT EVAL LOW COMPLEX 20 MIN: CPT

## 2018-08-06 PROCEDURE — 97165 OT EVAL LOW COMPLEX 30 MIN: CPT

## 2018-08-06 PROCEDURE — 1200000000 HC SEMI PRIVATE

## 2018-08-06 PROCEDURE — 51798 US URINE CAPACITY MEASURE: CPT

## 2018-08-06 PROCEDURE — G8987 SELF CARE CURRENT STATUS: HCPCS

## 2018-08-06 PROCEDURE — 6360000002 HC RX W HCPCS: Performed by: INTERNAL MEDICINE

## 2018-08-06 PROCEDURE — G8988 SELF CARE GOAL STATUS: HCPCS

## 2018-08-06 PROCEDURE — 2580000003 HC RX 258: Performed by: INTERNAL MEDICINE

## 2018-08-06 PROCEDURE — 6370000000 HC RX 637 (ALT 250 FOR IP): Performed by: FAMILY MEDICINE

## 2018-08-06 PROCEDURE — 6360000002 HC RX W HCPCS: Performed by: FAMILY MEDICINE

## 2018-08-06 PROCEDURE — G8978 MOBILITY CURRENT STATUS: HCPCS

## 2018-08-06 PROCEDURE — C9113 INJ PANTOPRAZOLE SODIUM, VIA: HCPCS | Performed by: INTERNAL MEDICINE

## 2018-08-06 RX ORDER — SODIUM CHLORIDE 9 MG/ML
INJECTION, SOLUTION INTRAVENOUS
Status: COMPLETED
Start: 2018-08-06 | End: 2018-08-06

## 2018-08-06 RX ADMIN — AZITHROMYCIN MONOHYDRATE 250 MG: 500 INJECTION, POWDER, LYOPHILIZED, FOR SOLUTION INTRAVENOUS at 10:38

## 2018-08-06 RX ADMIN — Medication 10 ML: at 08:58

## 2018-08-06 RX ADMIN — Medication 10 ML: at 20:32

## 2018-08-06 RX ADMIN — LEVOTHYROXINE SODIUM 50 MCG: 50 TABLET ORAL at 08:58

## 2018-08-06 RX ADMIN — METHYLPREDNISOLONE SODIUM SUCCINATE 40 MG: 40 INJECTION, POWDER, FOR SOLUTION INTRAMUSCULAR; INTRAVENOUS at 20:33

## 2018-08-06 RX ADMIN — Medication 2 PUFF: at 11:25

## 2018-08-06 RX ADMIN — Medication 2 PUFF: at 20:44

## 2018-08-06 RX ADMIN — FOLIC ACID 1 MG: 1 TABLET ORAL at 08:58

## 2018-08-06 RX ADMIN — MIRTAZAPINE 30 MG: 15 TABLET, FILM COATED ORAL at 20:35

## 2018-08-06 RX ADMIN — PANTOPRAZOLE SODIUM 40 MG: 40 INJECTION, POWDER, FOR SOLUTION INTRAVENOUS at 08:58

## 2018-08-06 RX ADMIN — SODIUM CHLORIDE 500 ML: 9 INJECTION, SOLUTION INTRAVENOUS at 10:38

## 2018-08-06 RX ADMIN — ENOXAPARIN SODIUM 40 MG: 100 INJECTION SUBCUTANEOUS at 08:58

## 2018-08-06 RX ADMIN — FINASTERIDE 5 MG: 5 TABLET, FILM COATED ORAL at 08:58

## 2018-08-06 RX ADMIN — TIOTROPIUM BROMIDE 18 MCG: 18 CAPSULE ORAL; RESPIRATORY (INHALATION) at 11:25

## 2018-08-06 RX ADMIN — TAMSULOSIN HYDROCHLORIDE 0.4 MG: 0.4 CAPSULE ORAL at 08:58

## 2018-08-06 RX ADMIN — METHYLPREDNISOLONE SODIUM SUCCINATE 40 MG: 40 INJECTION, POWDER, FOR SOLUTION INTRAMUSCULAR; INTRAVENOUS at 08:58

## 2018-08-06 ASSESSMENT — PAIN SCALES - GENERAL: PAINLEVEL_OUTOF10: 0

## 2018-08-06 NOTE — PROGRESS NOTES
Limits    Social/Functional History  Social/Functional History  Lives With: Alone  Type of Home: Assisted living ([facility])  Home Layout: One level  Home Access: Level entry, Elevator  Bathroom Shower/Tub: Walk-in shower  Bathroom Toilet: Standard (toilet raiser over std toilet)  Bathroom Equipment: Grab bars in shower, Toilet raiser, Built-in shower seat (Shower bench)  ADL Assistance: Independent  Homemaking Responsibilities: No  Ambulation Assistance: Independent (without device in apt, w/c with assitance to dining funez)  Transfer Assistance: Independent  Active : No  Occupation: Retired     Objective   Observation/Palpation  Posture: Good  Observation: Self corrects to maintain good upright posture    AROM RLE (degrees)  RLE AROM: WFL  AROM LLE (degrees)  LLE AROM : WFL     Strength RLE  Strength RLE: WFL  Comment: Grossly 4/5 to 4+/5 throughout; not formally assessed  Strength LLE  Strength LLE: WFL  Comment: Grossly 4/5 to 4+/5 throughout; not formally assessed     Motor Control  Gross Motor?: WNL     Bed mobility  Supine to Sit: Stand by assistance  Scooting: Stand by assistance  Comment: requiring verbal cues as he is legally blind and unfamiliar with the room set up     Transfers  Sit to Stand: Contact guard assistance  Stand to sit: Contact guard assistance  Bed to Chair: Contact guard assistance  Comment: using hand-held assist     Ambulation  Ambulation?: Yes  Ambulation 1  Surface: level tile  Device: Funez rail;Hand-Held Assist  Assistance: Contact guard assistance;Stand by assistance  Quality of Gait: Ambulating with steady gait, step through pattern, and cautious/slower cintia. Requiring hand-held assist and constant guidance to navigate hallway and within his room due to decreased vision. CGA progressed to SBA when pt was utilizing funez HR as he maintained a steady ROSE with ambulation.   Distance: x120 feet     Balance  Posture: Good  Sitting - Static: Good  Sitting - Dynamic: Good;-  Standing - Static: Good;-  Standing - Dynamic: Fair;+        Assessment   Body structures, Functions, Activity limitations: Decreased endurance;Decreased functional mobility ; Decreased strength;Decreased balance  Assessment: Pt presents on 8/5/18 with an exacerbation of COPD. Pt is legally blind, but still living independently at the [facility] at baseline. Requiring CGA with functional mobility to ensure safety with OOB activity. Verbal cues utilized for hand placement when walking and instructed pt to ambulate in the hallway while holding HRs. Pt would benefit from at least 1 home health PT visit to assess the patient's home environment and appropriately create a HEP for the pt. Recommend DC home with 24 hr assist and HHPT to improve strengthening and endurance for functional activities  Treatment Diagnosis: weakness and impaired functional mobility  Specific instructions for Next Treatment: progress mobility as tolerated  Prognosis: Excellent  Decision Making: Low Complexity  Patient Education: Role of PT, transfer technique, safety,  REQUIRES PT FOLLOW UP: Yes  Activity Tolerance  Activity Tolerance: Patient Tolerated treatment well         Plan   Plan  Times per week: 3-5x/week  Times per day: Daily  Specific instructions for Next Treatment: progress mobility as tolerated  Current Treatment Recommendations: Strengthening, Balance Training, Endurance Training  Safety Devices  Type of devices: All fall risk precautions in place, Bed alarm in place, Call light within reach, Nurse notified, Gait belt, Patient at risk for falls, Left in bed    G-Code  PT G-Codes  Functional Assessment Tool Used: [facility] without stairs  Score: 17  Functional Limitation: Mobility: Walking and moving around  Mobility: Walking and Moving Around Current Status (): At least 20 percent but less than 40 percent impaired, limited or restricted  Mobility: Walking and Moving Around Goal Status ():  At least 1 percent but less than 20 percent impaired, limited or restricted    AM-PAC Score   AM-PAC Inpatient Mobility without Stair Climbing Raw Score : 17  AM-PAC Inpatient without Stair Climbing T-Scale Score : 48.47  Mobility Inpatient CMS 0-100% Score: 32.72  Mobility Inpatient without Stair CMS G-Code Modifier : CJ       Goals  Short term goals  Time Frame for Short term goals: 1 week  Short term goal 1: Pt will ambulate x200 feet using hand-held assist to assist with guidance and supervision to improve endurance and functional mobility. Short term goal 2: Pt will perform 12-15 reps of BLE exercises to improve strength and balance for functional activities. Short term goal 3: Pt will perform sit<>stand independently using hand-help assist for guidance.   Patient Goals   Patient goals : \"to go back home and learn what exercises I can do\"       Therapy Time   Individual Concurrent Group Co-treatment   Time In [address] [place] [address]         Time Out 1524         Minutes 31         Timed Code Treatment Minutes: [address], SPT

## 2018-08-06 NOTE — PROGRESS NOTES
Hospitalist Progress Note      PCP: Miguel Ángel Denise MD    Date of Admission: 8/3/2018    Chief Complaint:  SOB    Subjective:  OOB to chair. No real complaints. Is anxious to be discharged back to [facility]. No family present. Medications:  Reviewed    Infusion Medications   Scheduled Medications    pantoprazole  40 mg Intravenous Daily    azithromycin  250 mg Intravenous Q24H    methylPREDNISolone  40 mg Intravenous Q12H    mometasone-formoterol  2 puff Inhalation BID    tiotropium  18 mcg Inhalation Daily    finasteride  5 mg Oral Daily    folic acid  1 mg Oral Daily    levothyroxine  50 mcg Oral Daily    mirtazapine  30 mg Oral Nightly    tamsulosin  0.4 mg Oral Daily    sodium chloride flush  10 mL Intravenous 2 times per day    enoxaparin  40 mg Subcutaneous Daily     PRN Meds: aluminum & magnesium hydroxide-simethicone, ipratropium-albuterol, phenol, sodium chloride flush, magnesium hydroxide, ondansetron, melatonin      Intake/Output Summary (Last 24 hours) at 08/06/18 1342  Last data filed at 08/06/18 1019   Gross per 24 hour   Intake              830 ml   Output             1050 ml   Net             -220 ml       Physical Exam Performed:    /70   Pulse 82   Temp 97.4 °F (36.3 °C) (Oral)   Resp 16   Ht 5' 11\" (1.803 m)   Wt 155 lb (70.3 kg)   SpO2 92%   BMI 21.62 kg/m²     General appearance: Pleasant elderly male in no apparent distress, appears stated age and cooperative. 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 Conjunctivae is clear. Knik. Neck: Supple, with full range of motion. No jugular venous distention. Trachea midline. Respiratory:  Normal respiratory effort. Reduced air entry bilaterally without Rales/has diffuse Wheezes/Rhonchi. Cardiovascular: Regular rate and rhythm with normal S1/S2 without murmurs, rubs or gallops. Abdomen: Soft, non-tender, non-distended with normal bowel sounds.   Musculoskeletal: No clubbing, cyanosis or edema bilaterally. Full range of motion without deformity. Skin: Skin color, texture, turgor normal.  No rashes or lesions. Neurologic:  Neurovascularly intact without any focal sensory/motor deficits. Cranial nerves: II-XII intact, grossly non-focal.  Blind. Psychiatric: Alert and oriented, forgetful  Capillary Refill: Brisk,< 3 seconds   Peripheral Pulses: +2 palpable, equal bilaterally       Labs:   Recent Labs      08/03/18   1640  08/04/18   0557   WBC  14.0*  8.8   HGB  15.0  14.3   HCT  43.7  41.9   PLT  181  163     Recent Labs      08/03/18   1640  08/04/18   0557   NA  136  139   K  3.7  3.3*   CL  101  104   CO2  22  19*   BUN  16  21*   CREATININE  1.1  1.2   CALCIUM  9.3  9.2     Recent Labs      08/03/18   1640   AST  23   ALT  19   BILITOT  3.3*   ALKPHOS  74     No results for input(s): INR in the last 72 hours. 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 No evident   pneumonia or congestive heart failure. Mild peribronchial wall thickening in all lung segments could represent acute   or chronic bronchitis versus mild bronchiectasis. Old 30% anterior wedge compression fracture near the thoracolumbar junction. XR CHEST STANDARD (2 VW)   Final Result   1. No acute abnormality. Assessment/Plan:    Active Hospital Problems    Diagnosis Date Noted    COPD exacerbation ([facility] [address] [J44.1] 08/03/2018     1. COPD exacerbation with hypoxia - pt not normally on oxygen but required 2 L NC. Now on RA. CT shows mod emphysema which is new to the patient. No pneumonia or edema. Started on steroids, breathing treatments, azithromycin. improving.   Wean oxygen, out of bed to

## 2018-08-06 NOTE — PROGRESS NOTES
Urology Progress Note      Subjective:   Pt has no c/o    Vitals:  /77   Pulse 91   Temp 97.4 °F (36.3 °C) (Oral)   Resp 16   Ht 5' 11\" (1.803 m)   Wt 155 lb (70.3 kg)   SpO2 91%   BMI 21.62 kg/m²   Temp  Av.5 °F (36.4 °C)  Min: 97.2 °F (36.2 °C)  Max: 98 °F (36.7 °C)    Intake/Output Summary (Last 24 hours) at 18 0952  Last data filed at 18 0331   Gross per 24 hour   Intake              695 ml   Output             1025 ml   Net             -330 ml       Exam:     Labs:  WBC:    Lab Results   Component Value Date    WBC 8.8 2018     Hemoglobin/Hematocrit:  Lab Results   Component Value Date    HGB 14.3 2018    HCT 41.9 2018     BMP:  Lab Results   Component Value Date     2018    K 3.3 2018     2018    CO2 19 2018    BUN 21 2018    LABALBU 3.9 2018    CREATININE 1.2 2018    CALCIUM 9.2 2018    GFRAA >60 2018    LABGLOM 57 2018     PT/INR:  No results found for: PROTIME, INR  PTT:  No results found for: APTT[APTT    Urinalysis:     Urine Culture:      Blood Culture:      Antibiotic Therapy:      Imaging:       Impression/Plan:   Continue finasteride, flomax 0.4mg  Voiding trial tomorrow    Sanjuanita Kunz PA-C

## 2018-08-06 NOTE — PROGRESS NOTES
Occupational Therapy   Occupational Therapy Initial Assessment/Treatment  Date: 2018   Patient Name: Rowdy Cartagena  MRN: [id number]     : 1929    Date of Service: 2018    Discharge Recommendations:  Home with Home health OT (24 hr supervision/assist)         Patient Diagnosis(es): The primary encounter diagnosis was Hypoxia. Diagnoses of COPD exacerbation ([address] and Bronchitis were also pertinent to this visit. has a past medical history of BPH (benign prostatic hyperplasia); Decreased vision; Dysphagia; Enlarged prostate; Glaucoma; Macular degeneration; and Thyroid disease. has a past surgical history that includes Tonsillectomy; eye surgery (Bilateral); eye surgery (Right, 14); back surgery; and Upper gastrointestinal endoscopy (2017). Restrictions  Restrictions/Precautions  Restrictions/Precautions: General Precautions, Fall Risk  Position Activity Restriction  Other position/activity restrictions: high fall risk, telemetry, ambulate pt, up with assist    Subjective   General  Chart Reviewed: Yes  Patient assessed for rehabilitation services?: Yes  Family / Caregiver Present: Yes  Referring Practitioner: CESAR Lora CNP 18  Diagnosis: sob  Subjective  Subjective: Pt. pleasant and agreeable to OT evaluation, states hopes to go home.   Pain Assessment  Patient Currently in Pain: Denies  Pain Assessment: 0-10  Pain Level: 0     Social/Functional History  Social/Functional History  Lives With: Alone  Type of Home: Assisted living  Home Layout: One level  Home Access: Level entry, Elevator  Bathroom Shower/Tub: Walk-in shower  Bathroom Toilet: Standard (toilet raiser over std toilet)  Bathroom Equipment: Grab bars in shower, Toilet raiser, Built-in shower seat (Shower bench)  ADL Assistance: Independent  Homemaking Responsibilities: No  Ambulation Assistance: Independent (without device in apt, w/c with assitance to dining funez)  Transfer Assistance: Independent  Active : No  Occupation: Retired       Objective        Orientation  Overall Orientation Status: Within Functional Limits  Observation/Palpation  Posture: Good  Observation: Seld corrects to maintain good upright posture     Balance  Sitting Balance: Supervision  Standing Balance: Stand by assistance  Functional Mobility  Functional - Mobility Device: No device (gait belt, HHA and banister d/t blindness)  Activity: Other  Assist Level: Stand by assistance (to CGA for safety d/t poor vision)    ADL  LE Dressing: Contact guard assistance (donning long pants)     Tone RUE  RUE Tone: Normotonic  Tone LUE  LUE Tone: Normotonic  Coordination  Movements Are Fluid And Coordinated: Yes     Bed mobility  Supine to Sit: Stand by assistance  Sit to Supine: Stand by assistance        Cognition  Overall Cognitive Status: Exceptions  Insights: Decreased awareness of deficits          BUE ROM/strength: WFL    Assessment   Performance deficits / Impairments: Decreased functional mobility ; Decreased endurance;Decreased ADL status; Decreased balance  After evaluation, pt found to be presenting with the above mentioned occupational performance deficits which are affecting participation in daily living skills. Pt would benefit from continued skilled occupational therapy to address ADLs, functional mobility, and safety while in acute care. Patient limited d/t poor vision and new environment, required CGA to pedro long pants and HHA/CGA for functional mobility. Prognosis: Good  Decision Making: Low Complexity  REQUIRES OT FOLLOW UP: Yes  Safety Devices  Safety Devices in place: Yes  Type of devices: Left in bed;Bed alarm in place;Call light within reach;Nurse notified;Gait belt         Plan   Plan  Times per week: 3-5x's a week while in acute care    G-Code  OT G-codes  Functional Assessment Tool Used: AM-PAC  Score: 18  Functional Limitation: Self care  Self Care Current Status ():  At least 40 percent but less than

## 2018-08-06 NOTE — PLAN OF CARE
Problem: Falls - Risk of: Intervention: Assess risk factors for falls  Patient remains free of falls this shift. Bed alarm on and functioning. Non skid footwear on when out of bed. Bed locked and in low position. Call light within reach.

## 2018-08-06 NOTE — CARE COORDINATION
CASE MANAGEMENT INITIAL ASSESSMENT      Reviewed chart and met with patient today, re: 80 y.o. Male   Explained Case Management role/services. Family present: dtr  Primary contact information: cathy Vargas [phone]    Admit date/status: 8/3/18  Diagnosis: COPD    Insurance: medicare  Precert required for SNF - n       3 night stay required - Y    Living arrangements, Adls, care needs, prior to admission: lives at [facility]    Transportation: likely [address] at home: Walker_x_Cane__RTS__ BSC__Shower Chair_x_  02__ HHN__ CPAP__  BiPap__  Hospital Bed__ W/Cx___ Other__________    Services in the home and/or outpatient, prior to admission: [facility]    PT/OT recs: pending    Hospital Exemption Notification (HEN): not initiated    Barriers to discharge: none    Plan/comments: patient states he plans on returning home to the [facility]. States he is active with [facility] there. And is open to increasing his services at discharge. Will have transportation provided by family at discharge. Will continue to follow.  Pavan Cox RN      ECOC on chart for MD signature

## 2018-08-07 VITALS
BODY MASS INDEX: 21.7 KG/M2 | WEIGHT: 155 LBS | TEMPERATURE: 97.4 F | HEART RATE: 67 BPM | RESPIRATION RATE: 16 BRPM | HEIGHT: 71 IN | SYSTOLIC BLOOD PRESSURE: 134 MMHG | DIASTOLIC BLOOD PRESSURE: 80 MMHG | OXYGEN SATURATION: 93 %

## 2018-08-07 LAB
ANION GAP SERPL CALCULATED.3IONS-SCNC: 9 MMOL/L (ref 3–16)
BASOPHILS ABSOLUTE: 0 K/UL (ref 0–0.2)
BASOPHILS RELATIVE PERCENT: 0.1 %
BUN BLDV-MCNC: 38 MG/DL (ref 7–20)
CALCIUM SERPL-MCNC: 9 MG/DL (ref 8.3–10.6)
CHLORIDE BLD-SCNC: 108 MMOL/L (ref 99–110)
CO2: 22 MMOL/L (ref 21–32)
CREAT SERPL-MCNC: 1.3 MG/DL (ref 0.8–1.3)
EOSINOPHILS ABSOLUTE: 0 K/UL (ref 0–0.6)
EOSINOPHILS RELATIVE PERCENT: 0 %
GFR AFRICAN AMERICAN: >60
GFR NON-AFRICAN AMERICAN: 52
GLUCOSE BLD-MCNC: 132 MG/DL (ref 70–99)
HCT VFR BLD CALC: 38.3 % (ref 40.5–52.5)
HEMOGLOBIN: 13.1 G/DL (ref 13.5–17.5)
LYMPHOCYTES ABSOLUTE: 0.9 K/UL (ref 1–5.1)
LYMPHOCYTES RELATIVE PERCENT: 7.4 %
MCH RBC QN AUTO: 33.8 PG (ref 26–34)
MCHC RBC AUTO-ENTMCNC: 34.1 G/DL (ref 31–36)
MCV RBC AUTO: 99.1 FL (ref 80–100)
MONOCYTES ABSOLUTE: 0.7 K/UL (ref 0–1.3)
MONOCYTES RELATIVE PERCENT: 5.7 %
NEUTROPHILS ABSOLUTE: 10.6 K/UL (ref 1.7–7.7)
NEUTROPHILS RELATIVE PERCENT: 86.8 %
PDW BLD-RTO: 13.8 % (ref 12.4–15.4)
PLATELET # BLD: 197 K/UL (ref 135–450)
PMV BLD AUTO: 8.4 FL (ref 5–10.5)
POTASSIUM SERPL-SCNC: 4.6 MMOL/L (ref 3.5–5.1)
RBC # BLD: 3.87 M/UL (ref 4.2–5.9)
SODIUM BLD-SCNC: 139 MMOL/L (ref 136–145)
WBC # BLD: 12.2 K/UL (ref 4–11)

## 2018-08-07 PROCEDURE — C9113 INJ PANTOPRAZOLE SODIUM, VIA: HCPCS | Performed by: INTERNAL MEDICINE

## 2018-08-07 PROCEDURE — 2580000003 HC RX 258: Performed by: FAMILY MEDICINE

## 2018-08-07 PROCEDURE — 97535 SELF CARE MNGMENT TRAINING: CPT

## 2018-08-07 PROCEDURE — 97110 THERAPEUTIC EXERCISES: CPT

## 2018-08-07 PROCEDURE — 36415 COLL VENOUS BLD VENIPUNCTURE: CPT

## 2018-08-07 PROCEDURE — 2580000003 HC RX 258

## 2018-08-07 PROCEDURE — 80048 BASIC METABOLIC PNL TOTAL CA: CPT

## 2018-08-07 PROCEDURE — 2580000003 HC RX 258: Performed by: INTERNAL MEDICINE

## 2018-08-07 PROCEDURE — 6360000002 HC RX W HCPCS: Performed by: FAMILY MEDICINE

## 2018-08-07 PROCEDURE — 85025 COMPLETE CBC W/AUTO DIFF WBC: CPT

## 2018-08-07 PROCEDURE — 6360000002 HC RX W HCPCS: Performed by: INTERNAL MEDICINE

## 2018-08-07 PROCEDURE — 6370000000 HC RX 637 (ALT 250 FOR IP): Performed by: FAMILY MEDICINE

## 2018-08-07 RX ORDER — AZITHROMYCIN 250 MG/1
250 TABLET, FILM COATED ORAL DAILY
Qty: 3 TABLET | Refills: 0 | Status: SHIPPED | OUTPATIENT
Start: 2018-08-07 | End: 2018-08-10

## 2018-08-07 RX ORDER — PREDNISONE 20 MG/1
TABLET ORAL
Qty: 7 TABLET | Refills: 0 | Status: SHIPPED | OUTPATIENT
Start: 2018-08-07 | End: 2021-02-26

## 2018-08-07 RX ADMIN — TAMSULOSIN HYDROCHLORIDE 0.4 MG: 0.4 CAPSULE ORAL at 09:54

## 2018-08-07 RX ADMIN — Medication 10 ML: at 09:55

## 2018-08-07 RX ADMIN — LEVOTHYROXINE SODIUM 50 MCG: 50 TABLET ORAL at 09:54

## 2018-08-07 RX ADMIN — METHYLPREDNISOLONE SODIUM SUCCINATE 40 MG: 40 INJECTION, POWDER, FOR SOLUTION INTRAMUSCULAR; INTRAVENOUS at 09:54

## 2018-08-07 RX ADMIN — PANTOPRAZOLE SODIUM 40 MG: 40 INJECTION, POWDER, FOR SOLUTION INTRAVENOUS at 09:54

## 2018-08-07 RX ADMIN — AZITHROMYCIN MONOHYDRATE 250 MG: 500 INJECTION, POWDER, LYOPHILIZED, FOR SOLUTION INTRAVENOUS at 09:54

## 2018-08-07 RX ADMIN — FINASTERIDE 5 MG: 5 TABLET, FILM COATED ORAL at 09:54

## 2018-08-07 RX ADMIN — ENOXAPARIN SODIUM 40 MG: 100 INJECTION SUBCUTANEOUS at 09:54

## 2018-08-07 RX ADMIN — FOLIC ACID 1 MG: 1 TABLET ORAL at 09:54

## 2018-08-07 NOTE — CARE COORDINATION
CASE MANAGEMENT DISCHARGE SUMMARY      Discharge to: [facility] with [address] [facility] [address] Durable Medical Equipment ordered/agency: none    Transportation: private   Family/car: car    Notified: patient aware of discharge. Family: notified   Facility/Agency: [facility] faxed [phone]   RN: ashley      Note: Discharging nurse to complete [facility], reconcile [facility], and place final copy with patient's discharge packet. RN to ensure that written prescriptions for  Level II medications are sent with patient to the facility as per protocol.       Trey Tracey RN

## 2018-08-07 NOTE — PROGRESS NOTES
Occupational Therapy  Facility/Department: [facility] C3 TELE/MED SURG/ONC  Daily Treatment Note  NAME: Ammy Robert  : 1929  MRN: [id number]    Date of Service: 2018    Discharge Recommendations:  Home with Home health OT (and initial 24hr A)  OT Equipment Recommendations  Equipment Needed: No    Patient Diagnosis(es): The primary encounter diagnosis was Hypoxia. Diagnoses of COPD exacerbation ([address] and Bronchitis were also pertinent to this visit. has a past medical history of BPH (benign prostatic hyperplasia); Decreased vision; Dysphagia; Enlarged prostate; Glaucoma; Macular degeneration; and Thyroid disease. has a past surgical history that includes Tonsillectomy; eye surgery (Bilateral); eye surgery (Right, 14); back surgery; and Upper gastrointestinal endoscopy (2017). Restrictions  Restrictions/Precautions  Restrictions/Precautions: General Precautions, Fall Risk  Position Activity Restriction  Other position/activity restrictions: high fall risk, telemetry, ambulate pt, up with assist     Subjective   General  Chart Reviewed: Yes  Patient assessed for rehabilitation services?: Yes  Family / Caregiver Present: No  Referring Practitioner: CESAR Virk CNP 18  Diagnosis: SOB 2/2 COPD exacerbation  Subjective  Subjective: Pt. pleasant and agreeable to OT tx, states hopes to go home. Cleared by RN for tx.      Pain Assessment  Patient Currently in Pain: Yes (unspecified level of chronic back pain with standing activity >5 mins, pt satisfied with rest/repositioning declining pain medicine, hot pack, or cold pack)    Orientation  Orientation  Overall Orientation Status: Impaired  Orientation Level: Oriented to person;Oriented to situation;Disoriented to place     Objective    ADL  Feeding: Contact guard assistance (to drink standing)  Grooming: Contact guard assistance (to wash hands, brush teeth, and wash face standing; limited in standing tolerance by chronic back pain; Yes  Activity Tolerance  Activity Tolerance: Patient Tolerated treatment well  Safety Devices  Safety Devices in place: Yes  Type of devices: Call light within reach;Nurse notified;Gait belt; Chair alarm in place; Left in chair       Plan   Plan  Times per week: 3-5x's a week while in acute care    AM-PAC Score   AM-[facility] Inpatient Daily Activity Raw Score: 20  AM-PAC Inpatient ADL T-Scale Score : 42.03  ADL Inpatient CMS 0-100% Score: 38.32  ADL Inpatient CMS G-Code Modifier : CJ    Goals  Short term goals  Time Frame for Short term goals: 1 week unless otherwise specified  Short term goal 1: Pt will complete LE dressing with SBA by 8/10  Short term goal 2: Pt. will complete toilet transfers with SBA. GOAL MET 8/7. Short term goal 3: Pt. will complete standing level ADLs with SBA  Patient Goals   Patient goals : \"to get stronger\"       Therapy Time   Individual Concurrent Group Co-treatment   Time In 0816         Time Out 1849         Minutes 41         Timed Code Treatment Minutes: 39 Minutes     If patient is discharged prior to next treatment session, this note will serve as the discharge summary.   Jazmín Villaseñor, OTR/L #[id number]

## 2018-08-07 NOTE — PLAN OF CARE
Problem: Falls - Risk of:  Goal: Will remain free from falls  Will remain free from falls   Outcome: Ongoing  Pt will remain free from falls. Pt is a high fall risk. Call light within reach. Bed side table within reach. Wheels locked. Bed in lowest position. Bed check in place. Pt instructed to call out for assistance. Pt expressesed understanding & calls out appropritately. All care per orders. Will continue to monitor.

## 2018-08-07 NOTE — PLAN OF CARE
Problem: Falls - Risk of:  Goal: Will remain free from falls  Will remain free from falls   Outcome: Ongoing  Pt remained free from falls during shift. Pt has skid resistant footwear on feet. Pt is alert and oriented. Pt was observed walking with a steady gait with stand by assistance. Pt is visually impaired, but is self aware and calls out appropriately. Pt has call light and bedside table within reach. Pt is checked on every hour by staff.

## 2018-08-08 LAB
BLOOD CULTURE, ROUTINE: NORMAL
EKG ATRIAL RATE: 78 BPM
EKG DIAGNOSIS: NORMAL
EKG Q-T INTERVAL: 382 MS
EKG QRS DURATION: 92 MS
EKG QTC CALCULATION (BAZETT): 451 MS
EKG R AXIS: 36 DEGREES
EKG T AXIS: 66 DEGREES
EKG VENTRICULAR RATE: 84 BPM

## 2021-02-26 ENCOUNTER — APPOINTMENT (OUTPATIENT)
Dept: GENERAL RADIOLOGY | Age: 86
DRG: 871 | End: 2021-02-26
Payer: MEDICARE

## 2021-02-26 ENCOUNTER — HOSPITAL ENCOUNTER (INPATIENT)
Age: 86
LOS: 5 days | Discharge: HOSPICE | DRG: 871 | End: 2021-03-03
Attending: EMERGENCY MEDICINE | Admitting: INTERNAL MEDICINE
Payer: MEDICARE

## 2021-02-26 ENCOUNTER — APPOINTMENT (OUTPATIENT)
Dept: CT IMAGING | Age: 86
DRG: 871 | End: 2021-02-26
Payer: MEDICARE

## 2021-02-26 DIAGNOSIS — R77.8 ELEVATED TROPONIN: ICD-10-CM

## 2021-02-26 DIAGNOSIS — R07.9 CHEST PAIN, UNSPECIFIED TYPE: Primary | ICD-10-CM

## 2021-02-26 DIAGNOSIS — J18.9 PNEUMONIA DUE TO ORGANISM: ICD-10-CM

## 2021-02-26 PROBLEM — A41.9 SEPSIS (HCC): Status: ACTIVE | Noted: 2021-02-26

## 2021-02-26 PROBLEM — R06.82 TACHYPNEA: Status: ACTIVE | Noted: 2021-02-26

## 2021-02-26 PROBLEM — R00.0 TACHYCARDIA: Status: ACTIVE | Noted: 2021-02-26

## 2021-02-26 PROBLEM — E87.6 HYPOKALEMIA: Status: ACTIVE | Noted: 2021-02-26

## 2021-02-26 PROBLEM — J44.9 COPD (CHRONIC OBSTRUCTIVE PULMONARY DISEASE) (HCC): Status: ACTIVE | Noted: 2021-02-26

## 2021-02-26 PROBLEM — E03.9 ACQUIRED HYPOTHYROIDISM: Status: ACTIVE | Noted: 2021-02-26

## 2021-02-26 PROBLEM — D72.9 NEUTROPHILIC LEUKOCYTOSIS: Status: ACTIVE | Noted: 2021-02-26

## 2021-02-26 LAB
A/G RATIO: 1.1 (ref 1.1–2.2)
ALBUMIN SERPL-MCNC: 3.1 G/DL (ref 3.4–5)
ALP BLD-CCNC: 76 U/L (ref 40–129)
ALT SERPL-CCNC: 33 U/L (ref 10–40)
ANION GAP SERPL CALCULATED.3IONS-SCNC: 14 MMOL/L (ref 3–16)
AST SERPL-CCNC: 40 U/L (ref 15–37)
BASOPHILS ABSOLUTE: 0.1 K/UL (ref 0–0.2)
BASOPHILS RELATIVE PERCENT: 0.6 %
BILIRUB SERPL-MCNC: 3.4 MG/DL (ref 0–1)
BUN BLDV-MCNC: 33 MG/DL (ref 7–20)
CALCIUM SERPL-MCNC: 9.4 MG/DL (ref 8.3–10.6)
CHLORIDE BLD-SCNC: 97 MMOL/L (ref 99–110)
CO2: 23 MMOL/L (ref 21–32)
CREAT SERPL-MCNC: 1.4 MG/DL (ref 0.8–1.3)
D DIMER: 760 NG/ML DDU (ref 0–229)
EKG ATRIAL RATE: 117 BPM
EKG DIAGNOSIS: NORMAL
EKG P AXIS: 57 DEGREES
EKG P-R INTERVAL: 182 MS
EKG Q-T INTERVAL: 344 MS
EKG QRS DURATION: 74 MS
EKG QTC CALCULATION (BAZETT): 479 MS
EKG R AXIS: 3 DEGREES
EKG T AXIS: 77 DEGREES
EKG VENTRICULAR RATE: 117 BPM
EOSINOPHILS ABSOLUTE: 0.1 K/UL (ref 0–0.6)
EOSINOPHILS RELATIVE PERCENT: 0.5 %
GFR AFRICAN AMERICAN: 57
GFR NON-AFRICAN AMERICAN: 47
GLOBULIN: 2.9 G/DL
GLUCOSE BLD-MCNC: 78 MG/DL (ref 70–99)
HCT VFR BLD CALC: 36.4 % (ref 40.5–52.5)
HEMOGLOBIN: 12.4 G/DL (ref 13.5–17.5)
LACTIC ACID: 1.8 MMOL/L (ref 0.4–2)
LIPASE: 11 U/L (ref 13–60)
LYMPHOCYTES ABSOLUTE: 1.8 K/UL (ref 1–5.1)
LYMPHOCYTES RELATIVE PERCENT: 13.5 %
MAGNESIUM: 1.8 MG/DL (ref 1.8–2.4)
MCH RBC QN AUTO: 36.1 PG (ref 26–34)
MCHC RBC AUTO-ENTMCNC: 34.1 G/DL (ref 31–36)
MCV RBC AUTO: 105.9 FL (ref 80–100)
MONOCYTES ABSOLUTE: 1.2 K/UL (ref 0–1.3)
MONOCYTES RELATIVE PERCENT: 8.8 %
NEUTROPHILS ABSOLUTE: 10.3 K/UL (ref 1.7–7.7)
NEUTROPHILS RELATIVE PERCENT: 76.6 %
PDW BLD-RTO: 14.4 % (ref 12.4–15.4)
PLATELET # BLD: 200 K/UL (ref 135–450)
PMV BLD AUTO: 9 FL (ref 5–10.5)
POTASSIUM REFLEX MAGNESIUM: 3.4 MMOL/L (ref 3.5–5.1)
PROCALCITONIN: 1 NG/ML (ref 0–0.15)
RBC # BLD: 3.43 M/UL (ref 4.2–5.9)
SARS-COV-2, NAAT: NOT DETECTED
SODIUM BLD-SCNC: 134 MMOL/L (ref 136–145)
TOTAL PROTEIN: 6 G/DL (ref 6.4–8.2)
TROPONIN: 0.03 NG/ML
WBC # BLD: 13.4 K/UL (ref 4–11)

## 2021-02-26 PROCEDURE — 83605 ASSAY OF LACTIC ACID: CPT

## 2021-02-26 PROCEDURE — 70498 CT ANGIOGRAPHY NECK: CPT

## 2021-02-26 PROCEDURE — 99285 EMERGENCY DEPT VISIT HI MDM: CPT

## 2021-02-26 PROCEDURE — 1200000000 HC SEMI PRIVATE

## 2021-02-26 PROCEDURE — 84484 ASSAY OF TROPONIN QUANT: CPT

## 2021-02-26 PROCEDURE — 71045 X-RAY EXAM CHEST 1 VIEW: CPT

## 2021-02-26 PROCEDURE — 2580000003 HC RX 258: Performed by: EMERGENCY MEDICINE

## 2021-02-26 PROCEDURE — 84145 PROCALCITONIN (PCT): CPT

## 2021-02-26 PROCEDURE — 96375 TX/PRO/DX INJ NEW DRUG ADDON: CPT

## 2021-02-26 PROCEDURE — 71260 CT THORAX DX C+: CPT

## 2021-02-26 PROCEDURE — 80053 COMPREHEN METABOLIC PANEL: CPT

## 2021-02-26 PROCEDURE — 93308 TTE F-UP OR LMTD: CPT

## 2021-02-26 PROCEDURE — 6370000000 HC RX 637 (ALT 250 FOR IP): Performed by: INTERNAL MEDICINE

## 2021-02-26 PROCEDURE — 96365 THER/PROPH/DIAG IV INF INIT: CPT

## 2021-02-26 PROCEDURE — 83690 ASSAY OF LIPASE: CPT

## 2021-02-26 PROCEDURE — 94640 AIRWAY INHALATION TREATMENT: CPT

## 2021-02-26 PROCEDURE — 85025 COMPLETE CBC W/AUTO DIFF WBC: CPT

## 2021-02-26 PROCEDURE — 93005 ELECTROCARDIOGRAM TRACING: CPT | Performed by: EMERGENCY MEDICINE

## 2021-02-26 PROCEDURE — 2580000003 HC RX 258: Performed by: INTERNAL MEDICINE

## 2021-02-26 PROCEDURE — 6360000002 HC RX W HCPCS: Performed by: EMERGENCY MEDICINE

## 2021-02-26 PROCEDURE — 6360000002 HC RX W HCPCS: Performed by: INTERNAL MEDICINE

## 2021-02-26 PROCEDURE — 96361 HYDRATE IV INFUSION ADD-ON: CPT

## 2021-02-26 PROCEDURE — 96368 THER/DIAG CONCURRENT INF: CPT

## 2021-02-26 PROCEDURE — 99222 1ST HOSP IP/OBS MODERATE 55: CPT | Performed by: INTERNAL MEDICINE

## 2021-02-26 PROCEDURE — 36415 COLL VENOUS BLD VENIPUNCTURE: CPT

## 2021-02-26 PROCEDURE — 87635 SARS-COV-2 COVID-19 AMP PRB: CPT

## 2021-02-26 PROCEDURE — 87040 BLOOD CULTURE FOR BACTERIA: CPT

## 2021-02-26 PROCEDURE — 93010 ELECTROCARDIOGRAM REPORT: CPT | Performed by: INTERNAL MEDICINE

## 2021-02-26 PROCEDURE — 83735 ASSAY OF MAGNESIUM: CPT

## 2021-02-26 PROCEDURE — 85379 FIBRIN DEGRADATION QUANT: CPT

## 2021-02-26 PROCEDURE — 6360000004 HC RX CONTRAST MEDICATION: Performed by: EMERGENCY MEDICINE

## 2021-02-26 RX ORDER — ACETAMINOPHEN 650 MG/1
650 SUPPOSITORY RECTAL EVERY 6 HOURS PRN
Status: DISCONTINUED | OUTPATIENT
Start: 2021-02-26 | End: 2021-03-03 | Stop reason: HOSPADM

## 2021-02-26 RX ORDER — SENNA PLUS 8.6 MG/1
1 TABLET ORAL 2 TIMES DAILY
Status: DISCONTINUED | OUTPATIENT
Start: 2021-02-26 | End: 2021-03-03

## 2021-02-26 RX ORDER — FOLIC ACID 1 MG/1
1 TABLET ORAL DAILY
Status: DISCONTINUED | OUTPATIENT
Start: 2021-02-26 | End: 2021-03-03

## 2021-02-26 RX ORDER — POTASSIUM CHLORIDE 20 MEQ/1
20 TABLET, EXTENDED RELEASE ORAL NIGHTLY
Status: ON HOLD | COMMUNITY
End: 2021-03-03 | Stop reason: HOSPADM

## 2021-02-26 RX ORDER — SODIUM CHLORIDE 0.9 % (FLUSH) 0.9 %
10 SYRINGE (ML) INJECTION PRN
Status: DISCONTINUED | OUTPATIENT
Start: 2021-02-26 | End: 2021-03-03 | Stop reason: HOSPADM

## 2021-02-26 RX ORDER — 0.9 % SODIUM CHLORIDE 0.9 %
500 INTRAVENOUS SOLUTION INTRAVENOUS ONCE
Status: COMPLETED | OUTPATIENT
Start: 2021-02-26 | End: 2021-02-26

## 2021-02-26 RX ORDER — FENTANYL CITRATE 50 UG/ML
25 INJECTION, SOLUTION INTRAMUSCULAR; INTRAVENOUS ONCE
Status: DISCONTINUED | OUTPATIENT
Start: 2021-02-26 | End: 2021-03-03

## 2021-02-26 RX ORDER — TAMSULOSIN HYDROCHLORIDE 0.4 MG/1
0.4 CAPSULE ORAL NIGHTLY
Status: DISCONTINUED | OUTPATIENT
Start: 2021-02-26 | End: 2021-03-03

## 2021-02-26 RX ORDER — FINASTERIDE 5 MG/1
5 TABLET, FILM COATED ORAL DAILY
Status: DISCONTINUED | OUTPATIENT
Start: 2021-02-26 | End: 2021-03-03

## 2021-02-26 RX ORDER — BUDESONIDE AND FORMOTEROL FUMARATE DIHYDRATE 160; 4.5 UG/1; UG/1
2 AEROSOL RESPIRATORY (INHALATION) 2 TIMES DAILY
Status: DISCONTINUED | OUTPATIENT
Start: 2021-02-26 | End: 2021-03-02

## 2021-02-26 RX ORDER — TAMSULOSIN HYDROCHLORIDE 0.4 MG/1
0.4 CAPSULE ORAL NIGHTLY
Status: ON HOLD | COMMUNITY
End: 2021-03-03 | Stop reason: HOSPADM

## 2021-02-26 RX ORDER — ONDANSETRON 2 MG/ML
4 INJECTION INTRAMUSCULAR; INTRAVENOUS EVERY 6 HOURS PRN
Status: DISCONTINUED | OUTPATIENT
Start: 2021-02-26 | End: 2021-03-03 | Stop reason: HOSPADM

## 2021-02-26 RX ORDER — FUROSEMIDE 40 MG/1
40 TABLET ORAL DAILY
Status: ON HOLD | COMMUNITY
End: 2021-03-03 | Stop reason: HOSPADM

## 2021-02-26 RX ORDER — LEVOTHYROXINE SODIUM 0.05 MG/1
75 TABLET ORAL DAILY
Status: DISCONTINUED | OUTPATIENT
Start: 2021-02-26 | End: 2021-03-03

## 2021-02-26 RX ORDER — ACETAMINOPHEN 325 MG/1
650 TABLET ORAL EVERY 6 HOURS PRN
Status: DISCONTINUED | OUTPATIENT
Start: 2021-02-26 | End: 2021-03-03 | Stop reason: HOSPADM

## 2021-02-26 RX ORDER — POLYETHYLENE GLYCOL 3350 17 G/17G
17 POWDER, FOR SOLUTION ORAL DAILY PRN
Status: DISCONTINUED | OUTPATIENT
Start: 2021-02-26 | End: 2021-03-03 | Stop reason: HOSPADM

## 2021-02-26 RX ORDER — UMECLIDINIUM 62.5 UG/1
AEROSOL, POWDER ORAL
COMMUNITY

## 2021-02-26 RX ORDER — SENNA PLUS 8.6 MG/1
1 TABLET ORAL 2 TIMES DAILY
Status: ON HOLD | COMMUNITY
End: 2021-03-03 | Stop reason: HOSPADM

## 2021-02-26 RX ORDER — 0.9 % SODIUM CHLORIDE 0.9 %
1000 INTRAVENOUS SOLUTION INTRAVENOUS ONCE
Status: DISCONTINUED | OUTPATIENT
Start: 2021-02-26 | End: 2021-03-03 | Stop reason: HOSPADM

## 2021-02-26 RX ORDER — WHEAT DEXTRIN 3 G/3.8 G
4 POWDER (GRAM) ORAL DAILY
Status: ON HOLD | COMMUNITY
End: 2021-03-03 | Stop reason: HOSPADM

## 2021-02-26 RX ORDER — MIRTAZAPINE 15 MG/1
30 TABLET, FILM COATED ORAL NIGHTLY
Status: DISCONTINUED | OUTPATIENT
Start: 2021-02-26 | End: 2021-03-01

## 2021-02-26 RX ORDER — SODIUM CHLORIDE 0.9 % (FLUSH) 0.9 %
10 SYRINGE (ML) INJECTION EVERY 12 HOURS SCHEDULED
Status: DISCONTINUED | OUTPATIENT
Start: 2021-02-26 | End: 2021-03-03

## 2021-02-26 RX ORDER — PROMETHAZINE HYDROCHLORIDE 25 MG/1
12.5 TABLET ORAL EVERY 6 HOURS PRN
Status: DISCONTINUED | OUTPATIENT
Start: 2021-02-26 | End: 2021-03-03 | Stop reason: HOSPADM

## 2021-02-26 RX ORDER — LEVOFLOXACIN 5 MG/ML
500 INJECTION, SOLUTION INTRAVENOUS EVERY 24 HOURS
Status: DISCONTINUED | OUTPATIENT
Start: 2021-02-26 | End: 2021-03-03

## 2021-02-26 RX ORDER — TRAMADOL HYDROCHLORIDE 50 MG/1
50 TABLET ORAL EVERY 6 HOURS PRN
Status: DISCONTINUED | OUTPATIENT
Start: 2021-02-26 | End: 2021-03-03 | Stop reason: HOSPADM

## 2021-02-26 RX ORDER — FENTANYL CITRATE 50 UG/ML
25 INJECTION, SOLUTION INTRAMUSCULAR; INTRAVENOUS ONCE
Status: COMPLETED | OUTPATIENT
Start: 2021-02-26 | End: 2021-02-26

## 2021-02-26 RX ORDER — ONDANSETRON 2 MG/ML
4 INJECTION INTRAMUSCULAR; INTRAVENOUS ONCE
Status: COMPLETED | OUTPATIENT
Start: 2021-02-26 | End: 2021-02-26

## 2021-02-26 RX ADMIN — ONDANSETRON 4 MG: 2 INJECTION INTRAMUSCULAR; INTRAVENOUS at 03:13

## 2021-02-26 RX ADMIN — LEVOFLOXACIN 500 MG: 5 INJECTION, SOLUTION INTRAVENOUS at 11:14

## 2021-02-26 RX ADMIN — POTASSIUM BICARBONATE 20 MEQ: 782 TABLET, EFFERVESCENT ORAL at 17:40

## 2021-02-26 RX ADMIN — IOPAMIDOL 75 ML: 755 INJECTION, SOLUTION INTRAVENOUS at 04:19

## 2021-02-26 RX ADMIN — SODIUM CHLORIDE, PRESERVATIVE FREE 10 ML: 5 INJECTION INTRAVENOUS at 20:11

## 2021-02-26 RX ADMIN — SODIUM CHLORIDE 1500 ML: 9 INJECTION, SOLUTION INTRAVENOUS at 03:14

## 2021-02-26 RX ADMIN — FINASTERIDE 5 MG: 5 TABLET, FILM COATED ORAL at 11:14

## 2021-02-26 RX ADMIN — IOPAMIDOL 75 ML: 755 INJECTION, SOLUTION INTRAVENOUS at 04:06

## 2021-02-26 RX ADMIN — SENNOSIDES 8.6 MG: 8.6 TABLET, FILM COATED ORAL at 20:11

## 2021-02-26 RX ADMIN — TIOTROPIUM BROMIDE INHALATION SPRAY 2 PUFF: 3.12 SPRAY, METERED RESPIRATORY (INHALATION) at 08:42

## 2021-02-26 RX ADMIN — TAMSULOSIN HYDROCHLORIDE 0.4 MG: 0.4 CAPSULE ORAL at 20:11

## 2021-02-26 RX ADMIN — CEFEPIME HYDROCHLORIDE 1000 MG: 1 INJECTION, POWDER, FOR SOLUTION INTRAMUSCULAR; INTRAVENOUS at 05:40

## 2021-02-26 RX ADMIN — Medication 2 PUFF: at 08:42

## 2021-02-26 RX ADMIN — FOLIC ACID 1 MG: 1 TABLET ORAL at 11:14

## 2021-02-26 RX ADMIN — LEVOTHYROXINE SODIUM 75 MCG: 0.05 TABLET ORAL at 11:14

## 2021-02-26 RX ADMIN — MIRTAZAPINE 30 MG: 15 TABLET, FILM COATED ORAL at 20:11

## 2021-02-26 RX ADMIN — VANCOMYCIN HYDROCHLORIDE 1250 MG: 1 INJECTION, POWDER, LYOPHILIZED, FOR SOLUTION INTRAVENOUS at 05:39

## 2021-02-26 RX ADMIN — ACETAMINOPHEN 650 MG: 325 TABLET ORAL at 14:05

## 2021-02-26 RX ADMIN — FENTANYL CITRATE 25 MCG: 50 INJECTION, SOLUTION INTRAMUSCULAR; INTRAVENOUS at 03:13

## 2021-02-26 RX ADMIN — SODIUM CHLORIDE, PRESERVATIVE FREE 10 ML: 5 INJECTION INTRAVENOUS at 11:14

## 2021-02-26 RX ADMIN — TRAMADOL HYDROCHLORIDE 50 MG: 50 TABLET, FILM COATED ORAL at 17:40

## 2021-02-26 ASSESSMENT — ENCOUNTER SYMPTOMS
WHEEZING: 0
NAUSEA: 0
SHORTNESS OF BREATH: 0
COLOR CHANGE: 0
RHINORRHEA: 0
BACK PAIN: 0
CHEST TIGHTNESS: 0
VOMITING: 0
PHOTOPHOBIA: 0
COUGH: 0

## 2021-02-26 ASSESSMENT — PAIN SCALES - GENERAL: PAINLEVEL_OUTOF10: 4

## 2021-02-26 ASSESSMENT — PAIN DESCRIPTION - LOCATION: LOCATION: CHEST

## 2021-02-26 NOTE — PROGRESS NOTES
Call placed to Stroke Team @ 5263  Re: code stroke per Dr. Sosa Wong   Stroke team called back @ 1818

## 2021-02-26 NOTE — CONSULTS
[address] [facility]  [phone]      Attending Physician: Max Weinberg MD  Reason for Consultation/Chief Complaint: CP    Subjective   History of Present Illness:  Guerrero Wagner is a 80 y.o. patient who presented to the hospital with complaints of pain. Patient states that he has been short of breath for last couple days and has been coughing every time he coughs he complains of a sharp chest pain in his left upper chest.  He states it happens with deep breathing as well but he has not sneezed and cannot comment. He denies any exertional chest pain or actual angina. No palpitations. He denies any fevers just complaining of the cough    Of note patient was noted to be tachycardic 117 and hypotensive at 90/55 emergency room    Past Medical History:   has a past medical history of BPH (benign prostatic hyperplasia), Decreased vision, Dysphagia, Enlarged prostate, Glaucoma, Macular degeneration, and Thyroid disease. Surgical History:   has a past surgical history that includes Tonsillectomy; eye surgery (Bilateral); eye surgery (Right, 6/13/14); back surgery; and Upper gastrointestinal endoscopy (04/20/2017). Social History:   reports that he quit smoking about 26 years ago. He has a 40.00 pack-year smoking history. He has never used smokeless tobacco. He reports current alcohol use of about 5.8 standard drinks of alcohol per week. He reports that he does not use drugs. Family History:  family history includes Cancer in his father and mother. Home Medications:  Were reviewed and are listed in nursing record and/or below  Prior to Admission medications    Medication Sig Start Date End Date Taking?  Authorizing Provider   Wheat Dextrin (BENEFIBER) POWD Take 4 g by mouth daily   Yes Historical Provider, MD   Fluticasone furoate-vilanterol (BREO ELLIPTA) 200-25 MCG/INH AEPB inhaler Inhale 1 puff into the lungs daily   Yes Historical Provider, MD furosemide (LASIX) 40 MG tablet Take 40 mg by mouth daily   Yes Historical Provider, MD   Umeclidinium Bromide (INCRUSE ELLIPTA) 62.5 MCG/INH AEPB Inhale into the lungs   Yes Historical Provider, MD   potassium chloride (KLOR-CON M) 20 MEQ extended release tablet Take 20 mEq by mouth nightly   Yes Historical Provider, MD   senna (SENOKOT) 8.6 MG tablet Take 1 tablet by mouth 2 times daily   Yes Historical Provider, MD   tamsulosin (FLOMAX) 0.4 MG capsule Take 0.4 mg by mouth nightly   Yes Historical Provider, MD   polyethylene glycol (MIRALAX) powder Take 17 g by mouth daily   Yes Historical Provider, MD   mirtazapine (REMERON) 30 MG tablet Take 30 mg by mouth nightly   Yes Historical Provider, MD   finasteride (PROSCAR) 5 MG tablet Take 5 mg by mouth daily   Yes Historical Provider, MD   Cyanocobalamin (VITAMIN B 12 PO) Take by mouth   Yes Historical Provider, MD   folic acid (FOLVITE) 1 MG tablet Take 1 mg by mouth daily.    Yes Historical Provider, MD   Levothyroxine Sodium 50 MCG CAPS Take 75 mg by mouth daily    Yes Historical Provider, MD        CURRENT Medications:      0.9 % sodium chloride bolus, Once      fentaNYL (SUBLIMAZE) injection 25 mcg, Once      finasteride (PROSCAR) tablet 5 mg, Daily      budesonide-formoterol (SYMBICORT) 160-4.5 MCG/ACT inhaler 2 puff, BID      folic acid (FOLVITE) tablet 1 mg, Daily      levothyroxine (SYNTHROID) tablet 75 mcg, Daily      mirtazapine (REMERON) tablet 30 mg, Nightly      senna (SENOKOT) tablet 8.6 mg, BID      tamsulosin (FLOMAX) capsule 0.4 mg, Nightly      tiotropium (SPIRIVA RESPIMAT) 2.5 MCG/ACT inhaler 2 puff, Daily      sodium chloride flush 0.9 % injection 10 mL, 2 times per day      sodium chloride flush 0.9 % injection 10 mL, PRN      enoxaparin (LOVENOX) injection 40 mg, Daily      promethazine (PHENERGAN) tablet 12.5 mg, Q6H PRN    Or      ondansetron (ZOFRAN) injection 4 mg, Q6H PRN   polyethylene glycol (GLYCOLAX) packet 17 g, Daily PRN      acetaminophen (TYLENOL) tablet 650 mg, Q6H PRN    Or      acetaminophen (TYLENOL) suppository 650 mg, Q6H PRN      levoFLOXacin (LEVAQUIN) 500 MG/100ML infusion 500 mg, Q24H        Allergies:  Patient has no known allergies. Review of Systems:   A 14 point review of symptoms completed. Pertinent positives identified in the HPI, all other review of symptoms negative as below.       Objective   PHYSICAL EXAM:    Vitals:    02/26/21 0734   BP: 109/68   Pulse: 99   Resp: 16   Temp:    SpO2: 94%    Weight: 155 lb (70.3 kg)         General Appearance:  Alert, cooperative, no distress, appears stated age   Head:  Normocephalic, without obvious abnormality, atraumatic   Eyes:  PERRL, conjunctiva/corneas clear   Nose: Nares normal, no drainage or sinus tenderness   Throat: Lips, mucosa, and tongue normal   Neck: Supple, symmetrical, trachea midline, no adenopathy, thyroid: not enlarged, symmetric, no tenderness/mass/nodules, no carotid bruit or JVD   Lungs:   Clear to auscultation bilaterally, respirations unlabored   Chest Wall:  No deformity or tenderness   Heart:  Regular rate and rhythm, S1, S2 normal, no murmur, rub or gallop   Abdomen:   Soft, non-tender, bowel sounds active all four quadrants,  no masses, no organomegaly   Extremities: Extremities normal, atraumatic, no cyanosis or edema   Pulses: 2+ and symmetric   Skin: Skin color, texture, turgor normal, no rashes or lesions   Pysch: Normal mood and affect   Neurologic: Normal gross motor and sensory exam.         Labs   CBC:   Lab Results   Component Value Date    WBC 13.4 02/26/2021    RBC 3.43 02/26/2021    HGB 12.4 02/26/2021    HCT 36.4 02/26/2021    .9 02/26/2021    RDW 14.4 02/26/2021     02/26/2021     CMP:  Lab Results   Component Value Date     02/26/2021    K 3.4 02/26/2021    CL 97 02/26/2021    CO2 23 02/26/2021    BUN 33 02/26/2021    CREATININE 1.4 02/26/2021 GFRAA 57 2021    AGRATIO 1.1 2021    LABGLOM 47 2021    GLUCOSE 78 2021    PROT 6.0 2021    CALCIUM 9.4 2021    BILITOT 3.4 2021    ALKPHOS 76 2021    AST 40 2021    ALT 33 2021     PT/INR:  No results found for: PTINR  HgBA1c:No results found for: LABA1C  Lab Results   Component Value Date    TROPONINI 0.03 (H) 2021         Cardiac Data     Last EK artifact, sinus tach at 117, PACs, low precordial voltage, septal infarct, no gross ischemia    Echo:2019  Overall left ventricular ejection fraction is estimated to be 55-60%. The left ventricular wall motion is normal.  Trace pericardial effusion. 2021   Summary   Limited for pericardial effusion. Small circumferential pericardial effusion without tamponade physiology. Stress Test:    Cath:    Studies:   CXR:  1. Left mid lung infiltrate, concerning for pneumonia.  Follow-up radiographs   are recommended to ensure resolution. 2. Emphysema. CTAP:  1. Left upper lobe pneumonia.  Follow-up radiographs are recommended to   ensure resolution. Cuco Sonny is an associated trace left pleural effusion. 2. Cirrhosis. 3. Cholelithiasis. 4. Atrophic kidneys. 5. Atherosclerotic disease. 6. Pericardial effusion. Assessment and Plan      1. Chest pain  2. Elevated Trops:   3. Chronic diastolic CHF  4. Pericardial effusion: small on echo  5. Possible TIA      PLAN  1. Typical chest pain that is more consistent with pleurisy than cardiac  2. Elevated troponins due to supply demand mismatch and does not represent acute plaque rupture.   -Do not feel heparin is required at this time with medically managed with aspirin and beta-blockers as tolerated  3. Would suggest follow-up echocardiogram in 1 to 2 months to make sure effusion does resolve. 4.  No further inpatient work-up at this time.   Patient can follow-up with his primary cardiologist Followed with Dr. Chayito Dueñas in 2019    Patient Active Problem List   Diagnosis    Unable to ambulate    Back pain    Back pain    Acute renal failure (ARF) ([address]    COPD exacerbation ([facility] [address]    Chest pain           Thank you for allowing us to participate in the care of Taina Najera Dr. Please call me with any questions [phone]. Farzad Thomas MD, [address] [facility] Cardiologist  [facility]  [phone] [facility]  [phone] [address] [place] [address]  2/26/2021 10:06 AM    I will address the patient's cardiac risk factors and adjusted pharmacologic treatment as needed. In addition, I have reinforced the need for patient directed risk factor modification. All questions and concerns were addressed to the patient/family. Alternatives to my treatment were discussed. The note was completed using EMR. Every effort was made to ensure accuracy; however, inadvertent computerized transcription errors may be present.

## 2021-02-26 NOTE — ED PROVIDER NOTES
[facility]  ED  EMERGENCY DEPARTMENTENCOUNTER      Pt Name: Leona Sousa  MRN: [id number]  [place] 2/11/1929  Date ofevaluation: 2/26/2021  Provider: Lucy Mulligan MD    CHIEF COMPLAINT       Chief Complaint   Patient presents with    Chest Pain     from [facility] coming in with CP with pain in right arm. Pt is blind. 324 mg asa given with 1 nitro sl. HISTORY OF PRESENT ILLNESS   (Location/Symptom, Timing/Onset,Context/Setting, Quality, Duration, Modifying Factors, Severity)  Note limiting factors. Leona Sousa is a 80 y.o. male  who  has a past medical history of BPH (benign prostatic hyperplasia), Decreased vision, Dysphagia, Enlarged prostate, Glaucoma, Macular degeneration, and Thyroid disease. who presents to the emergency department for evaluation of chest pain. Patient reports an acute onset of left parasternal chest pain. He describes it as a stabbing pain with radiation to left arm. Denies a history of previous. Denies associated shortness of breath. He does report he has received 2 Covid vaccines. Denies recent fevers cough or abdominal pain. Denies a history of coronary artery disease. Reviewed the patient's paperwork does have a healthcare power of  but no advanced directives. Patient noted to be tachycardic and hypotensive on arrival to the ED. HPI    NursingNotes were reviewed. REVIEW OF SYSTEMS    (2-9 systems for level 4, 10 or more for level 5)     Review of Systems   Constitutional: Negative for activity change, chills and fever. HENT: Negative for congestion and rhinorrhea. Eyes: Negative for photophobia and visual disturbance. Respiratory: Negative for cough, chest tightness, shortness of breath and wheezing. Cardiovascular: Positive for chest pain. Negative for palpitations and leg swelling. Gastrointestinal: Negative for nausea and vomiting. Endocrine: Negative for polydipsia and polyuria. Genitourinary: Negative for difficulty urinating and frequency. Musculoskeletal: Negative for back pain and gait problem. Skin: Negative for color change and rash. Neurological: Negative for weakness, light-headedness and headaches. Psychiatric/Behavioral: Negative for confusion. The patient is not nervous/anxious. All other systems reviewed and are negative. Except as noted above the remainder of the review of systems was reviewed and negative. PAST MEDICAL HISTORY     Past Medical History:   Diagnosis Date    BPH (benign prostatic hyperplasia)     Decreased vision     left    Dysphagia     Enlarged prostate     Glaucoma     Macular degeneration     Thyroid disease     hypothyroid         SURGICALHISTORY       Past Surgical History:   Procedure Laterality Date    BACK SURGERY      insertion of cement due to crushed vertebrae    EYE SURGERY Bilateral     cataracts    EYE SURGERY Right 6/13/14    [place]    TONSILLECTOMY      UPPER GASTROINTESTINAL ENDOSCOPY  04/20/2017    dilated esophagus         CURRENT MEDICATIONS       Previous Medications    CYANOCOBALAMIN (VITAMIN B 12 PO)    Take by mouth    FINASTERIDE (PROSCAR) 5 MG TABLET    Take 5 mg by mouth daily    FLUTICASONE FUROATE-VILANTEROL (BREO ELLIPTA) 200-25 MCG/INH AEPB INHALER    Inhale 1 puff into the lungs daily    FOLIC ACID (FOLVITE) 1 MG TABLET    Take 1 mg by mouth daily.     FUROSEMIDE (LASIX) 40 MG TABLET    Take 40 mg by mouth daily    LEVOTHYROXINE SODIUM 50 MCG CAPS    Take 75 mg by mouth daily     MIRTAZAPINE (REMERON) 30 MG TABLET    Take 30 mg by mouth nightly    POLYETHYLENE GLYCOL (MIRALAX) POWDER    Take 17 g by mouth daily    POTASSIUM CHLORIDE (KLOR-CON M) 20 MEQ EXTENDED RELEASE TABLET    Take 20 mEq by mouth nightly    SENNA (SENOKOT) 8.6 MG TABLET    Take 1 tablet by mouth 2 times daily    TAMSULOSIN (FLOMAX) 0.4 MG CAPSULE    Take 0.4 mg by mouth nightly UMECLIDINIUM BROMIDE (INCRUSE ELLIPTA) 62.5 MCG/INH AEPB    Inhale into the lungs    WHEAT DEXTRIN (BENEFIBER) POWD    Take 4 g by mouth daily            Patient has no known allergies. FAMILY HISTORY       Family History   Problem Relation Age of Onset    Cancer Mother     Cancer Father           SOCIAL HISTORY       Social History     Socioeconomic History    Marital status:      Spouse name: None    Number of children: None    Years of education: None    Highest education level: None   Occupational History    None   Social Needs    Financial resource strain: None    Food insecurity     Worry: None     Inability: None    Transportation needs     Medical: None     Non-medical: None   Tobacco Use    Smoking status: Former Smoker     Packs/day: 1.00     Years: 40.00     Pack years: 40.00     Quit date: 1995     Years since quittin.1    Smokeless tobacco: Never Used   Substance and Sexual Activity    Alcohol use:  Yes     Alcohol/week: 5.8 standard drinks     Types: 7 Standard drinks or equivalent per week     Comment: daily--4 oz. gin    Drug use: No    Sexual activity: None   Lifestyle    Physical activity     Days per week: None     Minutes per session: None    Stress: None   Relationships    Social connections     Talks on phone: None     Gets together: None     Attends [employer] service: None     Active member of club or organization: None     Attends meetings of clubs or organizations: None     Relationship status: None    Intimate partner violence     Fear of current or ex partner: None     Emotionally abused: None     Physically abused: None     Forced sexual activity: None   Other Topics Concern    None   Social History Narrative    None       SCREENINGS             PHYSICAL EXAM    (up to 7 for level 4, 8 or more for level 5)     ED Triage Vitals   BP Temp Temp Source Pulse Resp SpO2 Height Weight 02/26/21 0303 02/26/21 0303 02/26/21 0303 02/26/21 0303 02/26/21 0303 02/26/21 0303 02/26/21 0259 02/26/21 0259   (!) 90/55 97.7 °F (36.5 °C) Oral 56 22 93 % 5' 11\" (1.803 m) 155 lb (70.3 kg)       Physical Exam  Vitals signs and nursing note reviewed. Constitutional:       General: He is not in acute distress. Appearance: He is well-developed. HENT:      Head: Normocephalic and atraumatic. Neck:      Musculoskeletal: Normal range of motion. Trachea: No tracheal deviation. Cardiovascular:      Rate and Rhythm: Regular rhythm. Tachycardia present. Pulses: Normal pulses. Pulmonary:      Effort: Pulmonary effort is normal.      Breath sounds: Normal breath sounds. No wheezing or rales. Abdominal:      General: There is no distension. Palpations: Abdomen is soft. Tenderness: There is no abdominal tenderness. Musculoskeletal: Normal range of motion. General: No deformity. Skin:     General: Skin is warm and dry. Capillary Refill: Capillary refill takes less than 2 seconds. Neurological:      General: No focal deficit present. Mental Status: He is alert and oriented to person, place, and time. Mental status is at baseline. Sensory: No sensory deficit. Motor: No weakness. RESULTS     EKG: All EKG's are interpreted by the Emergency Department Physician who either signs or Co-signsthis chart in the absence of a cardiologist.    EKG shows a sinus rhythm with sinus arrhythmia with a ventricular of 117 bpm.  MN interval is prolonged and QTc interval is prolonged. Patient has normal axis. There are significant ST elevations depressions EKG is nondiagnostic for ACS. Compared EKG from 8/3/2018 did not appreciate significant changes.      RADIOLOGY:   Non-plain filmimages such as CT, Ultrasound and MRI are read by the radiologist. Plain radiographic images are visualized and preliminarily interpreted by the emergency physician with the below findings: Interpretation per the Radiologist below, if available at the time ofthis note:    XR CHEST PORTABLE   Preliminary Result   1. Left midlung infiltrate, concerning for pneumonia. Follow-up radiographs   are recommended to ensure resolution. 2. Emphysema.          CT CHEST ABDOMEN PELVIS W CONTRAST    (Results Pending)         ED BEDSIDE ULTRASOUND:   Performed by ED Physician - none    LABS:  Labs Reviewed   CBC WITH AUTO DIFFERENTIAL - Abnormal; Notable for the following components:       Result Value    WBC 13.4 (*)     RBC 3.43 (*)     Hemoglobin 12.4 (*)     Hematocrit 36.4 (*)     .9 (*)     MCH 36.1 (*)     Neutrophils Absolute 10.3 (*)     All other components within normal limits    Narrative:     Performed at:  [address], [facility]   Phone [phone]   COMPREHENSIVE METABOLIC PANEL W/ REFLEX TO MG FOR LOW K - Abnormal; Notable for the following components:    Sodium 134 (*)     Potassium reflex Magnesium 3.4 (*)     Chloride 97 (*)     BUN 33 (*)     CREATININE 1.4 (*)     GFR Non- 47 (*)     GFR  57 (*)     Total Protein 6.0 (*)     Albumin 3.1 (*)     Total Bilirubin 3.4 (*)     AST 40 (*)     All other components within normal limits    Narrative:     Performed at:  [address], [facility] [place]   Phone [phone]   LIPASE - Abnormal; Notable for the following components:    Lipase 11.0 (*)     All other components within normal limits    Narrative:     Performed at:  [address], [facility] [place]   Phone [phone]   D-DIMER, QUANTITATIVE - Abnormal; Notable for the following components:    D-Dimer, Quant 760 (*)     All other components within normal limits    Narrative:     Performed at:  [facility] [address] [place] [address], [facility]   Phone [phone]   TROPONIN - Abnormal; Notable for the following components:    Troponin 0.03 (*)     All other components within normal limits    Narrative:     Performed at:  [address], [facility]   Phone [phone]   LACTIC ACID, PLASMA    Narrative:     Performed at:  [place] [address] [facility]   Phone [phone]   MAGNESIUM    Narrative:     Performed at:  [place] [address] [facility]   Phone [phone]   URINE RT REFLEX TO CULTURE       All other labs were within normal range or not returned as of this dictation. EMERGENCY DEPARTMENT COURSE and DIFFERENTIAL DIAGNOSIS/MDM:   Vitals:    Vitals:    02/26/21 0259 02/26/21 0303   BP:  (!) 90/55   Pulse:  56   Resp:  22   Temp:  97.7 °F (36.5 °C)   TempSrc:  Oral   SpO2:  93%   Weight: 155 lb (70.3 kg)    Height: 5' 11\" (1.803 m)        Patient was given thefollowing medications:  Medications   0.9 % sodium chloride bolus (500 mLs Intravenous New Bag 2/26/21 0314)   ondansetron (ZOFRAN) injection 4 mg (4 mg Intravenous Given 2/26/21 0313)   fentaNYL (SUBLIMAZE) injection 25 mcg (25 mcg Intravenous Given 2/26/21 0313)       ED COURSE & MEDICAL DECISION MAKING    Pertinent Labs & Imaging studies reviewed. (See chart for details)   -  Patient seen and evaluated in the emergency department. -  Triage and nursing notes reviewed and incorporated. -  Old chart records reviewed and incorporated.   -  Differential diagnosis includes: Differential Diagnosis: Acute Coronary Syndrome, Congestive Heart Failure, Thoracic Dissection, Pericarditis, Pericardial Effusion, Pulmonary Embolism, Pneumonia, Pneumothorax, Ischemic Bowel, Bowel Obstruction, PUD, GERD, Acute Cholecystitis, Pancreatitis, Hepatitis, Colitis, other -  Work-up included:  See above  -  ED treatment included: See above  -  Results discussed with patient. Patient presents the ED for evaluation of acute left-sided parasternal chest pain. Presentation he is tachycardic and hypertensive. He does have equal pulses in all extremities. He has a normal respiratory effort denies recent infectious symptoms. Labs show leukocytosis and an elevated troponin of 0.03. Renal function is slightly elevated. Imaging studies show left upper lobe pneumonia/patient was started on healthcare associated coverage. Rapid Covid was ordered. Patient will be admitted for further medical management evaluation. Patient feels well on reevaluation. Remains awake and alert answering questions and is only complaining of chest pain. The patient is agreeable with plan of care and disposition. REASSESSMENT     ED Course as of Feb 26 0510 Fri Feb 26, 2021   0406 I was informed that the patient had an acute change of mental status while in CT pending CT of the chest abdomen and pelvis. When I went to evaluate the patient at bedside he appeared confused and was ANO x1. He had new right-sided facial droop, was able to raise eyebrows. He had no drift in the upper or lower extremities and not report any sensory deficits. Stroke team was emergently called. [CS]   [id number] I spoke with the stroke neurologist and discussed the events leading to the stroke team alert. Based on the patient's deficits age and comorbidities they did not recommend intervening currently. [CS]   [id number] I went to evaluate the patient and he was awake and alert answering questions. He had no drift in the lower extremities or upper extremities. He was ANO x3.     [CS]      ED Course User Index  [CS] Cate Toussaint MD         CRITICAL CARE TIME   Total Critical Care time was 35 minutes, excluding separatelyreportable procedures. There was a high probability ofclinically significant/life threatening deterioration in the patient's condition which required my urgent intervention. CONSULTS:  None    PROCEDURES:  Unless otherwise noted below, none     Procedures    FINAL IMPRESSION      1. Chest pain, unspecified type    2. Pneumonia due to organism    3. Elevated troponin          DISPOSITION/PLAN   DISPOSITION        PATIENT REFERREDTO:  No follow-up provider specified.     DISCHARGEMEDICATIONS:  New Prescriptions    No medications on file          (Please note that portions of this note were completed with a voice recognition program.  Efforts were made to edit the dictations but occasionally words are mis-transcribed.)    Parag Ku MD (electronically signed)  Attending Emergency Physician          Parag Ku MD  02/26/21 2040

## 2021-02-26 NOTE — ED NOTES

## 2021-02-26 NOTE — ED NOTES
Bed: 15  Expected date:   Expected time:   Means of arrival:   Comments:   [id number] Alise Stapleton RN  02/26/21 9824

## 2021-02-26 NOTE — FLOWSHEET NOTE
02/26/21 1409   Encounter Summary   Services provided to: Patient and family together   Referral/Consult From: Family   Support System Family members   Continue Visiting   (2-26, adelina [facility]awl.  )   Complexity of Encounter Moderate   Length of Encounter 30 minutes   Spiritual/Hindu   Type Spiritual support   Assessment Calm; Approachable   Intervention Active listening;Nurtured hope;Prayer   Outcome Engaged in conversation;Receptive   Patient is a devout [facility]. Offered prayer and support. Continue prn visits.

## 2021-02-26 NOTE — ED NOTES
Patient resting in bed at this time. Patient awake and appears to be comfortable. Patient denies any needs at this time.       Sanjay Pratt RN  02/26/21 7197

## 2021-02-26 NOTE — ED NOTES
Report given to [facility], floor nurse.  Patient left unit with all belongings including but not limited to; left hearing aid, clothing       Edmond Clements RN  02/26/21 5183

## 2021-02-26 NOTE — H&P
Hospital Medicine  History and Physical    PCP: Thiago Casas MD  Patient Name: Slime Birch    Date of Service: Pt seen/examined on 02/26/2021 and admitted to Inpatient with expected LOS greater than two midnights due to medical therapy    CHIEF COMPLAINT:  Pt c/o left sided chest pain with radiation into the left arm. HISTORY OF PRESENT ILLNESS: Pt is an 80y.o. year-old male with a history of COPD, Hypothyroidism, Glaucoma, Macular degeneration and BPH presents from his nursing home for evaluation following an acute onset of sharp, stabbing pain in his left chest wall that radiates to his left arm. The pain is present constantly and is worse when he takes a deep breath. In the ER he was found to have Pneumonia, associated sepsis and an elevated troponin. His EKG had no ischemic changes. Associated signs and symptoms do not include fever or chills, nausea, vomiting, abdominal pain, hemoptysis, hematochezia, diarrhea, constipation or urinary symptoms. Past Medical History:        Diagnosis Date    BPH (benign prostatic hyperplasia)     Decreased vision     left    Dysphagia     Enlarged prostate     Glaucoma     Macular degeneration     Thyroid disease     hypothyroid       Past Surgical History:        Procedure Laterality Date    BACK SURGERY      insertion of cement due to crushed vertebrae    EYE SURGERY Bilateral     cataracts    EYE SURGERY Right 6/13/14    [place]    TONSILLECTOMY      UPPER GASTROINTESTINAL ENDOSCOPY  04/20/2017    dilated esophagus       Allergies:  Patient has no known allergies. Medications Prior to Admission:    Prior to Admission medications    Medication Sig Start Date End Date Taking?  Authorizing Provider   Wheat Dextrin (BENEFIBER) POWD Take 4 g by mouth daily   Yes Historical Provider, MD   Fluticasone furoate-vilanterol (BREO ELLIPTA) 200-25 MCG/INH AEPB inhaler Inhale 1 puff into the lungs daily   Yes Historical Provider, MD furosemide (LASIX) 40 MG tablet Take 40 mg by mouth daily   Yes Historical Provider, MD   Umeclidinium Bromide (INCRUSE ELLIPTA) 62.5 MCG/INH AEPB Inhale into the lungs   Yes Historical Provider, MD   potassium chloride (KLOR-CON M) 20 MEQ extended release tablet Take 20 mEq by mouth nightly   Yes Historical Provider, MD   senna (SENOKOT) 8.6 MG tablet Take 1 tablet by mouth 2 times daily   Yes Historical Provider, MD   tamsulosin (FLOMAX) 0.4 MG capsule Take 0.4 mg by mouth nightly   Yes Historical Provider, MD   polyethylene glycol (MIRALAX) powder Take 17 g by mouth daily   Yes Historical Provider, MD   mirtazapine (REMERON) 30 MG tablet Take 30 mg by mouth nightly   Yes Historical Provider, MD   finasteride (PROSCAR) 5 MG tablet Take 5 mg by mouth daily   Yes Historical Provider, MD   Cyanocobalamin (VITAMIN B 12 PO) Take by mouth   Yes Historical Provider, MD   folic acid (FOLVITE) 1 MG tablet Take 1 mg by mouth daily. Yes Historical Provider, MD   Levothyroxine Sodium 50 MCG CAPS Take 75 mg by mouth daily    Yes Historical Provider, MD       Family History:       Problem Relation Age of Onset    Cancer Mother     Cancer Father      Social History:   TOBACCO:   reports that he quit smoking about 26 years ago. He has a 40.00 pack-year smoking history. He has never used smokeless tobacco.  ETOH:   reports current alcohol use of about 5.8 standard drinks of alcohol per week. OCCUPATION:      REVIEW OF SYSTEMS:  A full review of systems was performed and is negative except for that which appears in the HPI    Physical Exam:    Vitals: /68   Pulse 99   Temp 97.5 °F (36.4 °C) (Oral)   Resp 16   Ht 5' 11\" (1.803 m)   Wt 155 lb (70.3 kg)   SpO2 94%   BMI 21.62 kg/m²   General appearance: WD/WN 80y.o. year-old  male who is alert, appears stated age and is cooperative  HEENT: Head: Normocephalic, no lesions, without obvious abnormality. Eye: Normal external eye, conjunctiva, lids cornea, PEERL. Ears: Normal external ears. Non-tender. Nose: Normal external nose, mucus membranes and septum. Pharynx: Dental Hygiene adequate. Normal buccal mucosa. Normal pharynx. Neck: no adenopathy, no carotid bruit, no JVD, supple, symmetrical, trachea midline and thyroid not enlarged, symmetric, no tenderness/mass/nodules  Lungs: clear to auscultation bilaterally and no use of accessory muscles. Heart: regular rate and rhythm, S1, S2 normal, no murmur, click, rub or gallop and normal apical impulse  Abdomen: soft, non-tender; bowel sounds normal; no masses, no organomegaly  Extremities: extremities atraumatic, no cyanosis or edema and Homans sign is negative, no sign of DVT. Capillary Refill: Acceptable < 3 seconds   Peripheral Pulses: +3 easily felt, not easily obliterated with pressures   Skin: Skin color, texture, turgor normal. No rashes or lesions on exposed skin  Neurologic: Neurovascularly intact without any focal sensory/motor deficits. Cranial nerves: II-XII intact, grossly non-focal. Gait was not tested. Mental Status: Alert and oriented, thought content appropriate, normal insight    CBC:   Recent Labs     02/26/21  0301   WBC 13.4*   HGB 12.4*        BMP:    Recent Labs     02/26/21  0301   *   K 3.4*   CL 97*   CO2 23   BUN 33*   CREATININE 1.4*   GLUCOSE 78     Troponin:   Recent Labs     02/26/21  0301 02/26/21  0852   TROPONINI 0.03* 0.03*     PT/INR:  No results found for: PTINR  U/A:    Lab Results   Component Value Date    LEUKOCYTESUR Negative 04/23/2016    SPECGRAV 1.015 04/23/2016    UROBILINOGEN 0.2 04/23/2016    BILIRUBINUR Negative 04/23/2016    BLOODU Negative 04/23/2016    GLUCOSEU Negative 04/23/2016    PROTEINU Negative 04/23/2016         RAD:   I have independently reviewed and interpreted the imaging studies below and based my recommendations to the patient on those findings.     Xr Chest Portable Result Date: 2/26/2021  EXAMINATION: ONE XRAY VIEW OF THE CHEST 2/26/2021 3:08 am COMPARISON: 08/03/2018 HISTORY: ORDERING SYSTEM PROVIDED HISTORY: cp TECHNOLOGIST PROVIDED HISTORY: Reason for exam:->cp Reason for Exam: CHEST PAIN FINDINGS: The cardiac silhouette and mediastinal contours are normal.  There is a left mid lung infiltrate, concerning for pneumonia. Follow-up radiographs are recommended to ensure resolution. The right lung is clear. Emphysematous changes are noted in the lungs. The visualized osseous structures are unremarkable. 1. Left mid lung infiltrate, concerning for pneumonia. Follow-up radiographs are recommended to ensure resolution. 2. Emphysema.      Cta Head Neck W Contrast    Result Date: 2/26/2021 EXAMINATION: CTA OF THE HEAD AND NECK WITH CONTRAST 2/26/2021 4:19 am: TECHNIQUE: CTA of the head and neck was performed with the administration of intravenous contrast. Multiplanar reformatted images are provided for review. MIP images are provided for review. Stenosis of the internal carotid arteries measured using NASCET criteria. Dose modulation, iterative reconstruction, and/or weight based adjustment of the mA/kV was utilized to reduce the radiation dose to as low as reasonably achievable. COMPARISON: None. HISTORY: ORDERING SYSTEM PROVIDED HISTORY: ams TECHNOLOGIST PROVIDED HISTORY: Reason for exam:->ams Decision Support Exception->Emergency Medical Condition (MA) Reason for Exam: Sudden AMS, speech difficulty and slurring FINDINGS: CTA NECK: AORTIC ARCH/ARCH VESSELS: No dissection or arterial injury. No significant stenosis of the brachiocephalic or subclavian arteries. CAROTID ARTERIES: Bilateral carotid bifurcation atherosclerotic plaque is seen with approximately 30% stenosis of the origin of the left internal carotid artery identified. No dissection, arterial injury, or further evidence of hemodynamically significant stenosis by NASCET criteria. VERTEBRAL ARTERIES: No dissection, arterial injury, or significant stenosis. SOFT TISSUES: The lung apices are clear. No cervical or superior mediastinal lymphadenopathy. The larynx and pharynx are unremarkable. No acute abnormality of the salivary and thyroid glands. BONES: No acute osseous abnormality. Severe centrilobular emphysema is visualized within the bilateral upper lungs. CTA HEAD: ANTERIOR CIRCULATION: Bilateral internal carotid artery cavernous segment scattered atherosclerotic calcification is present with up to approximately 20% bilateral arterial stenosis identified. No further evidence of significant stenosis of the intracranial internal carotid, anterior cerebral, or middle cerebral arteries. No aneurysm.  POSTERIOR CIRCULATION: No significant stenosis of the vertebral, basilar, or posterior cerebral arteries. No aneurysm. OTHER: No dural venous sinus thrombosis on this non-dedicated study. BRAIN: No mass effect or midline shift. No extra-axial fluid collection. The gray-white differentiation is maintained. 1. Bilateral internal carotid artery cavernous segment scattered atherosclerotic calcification resulting up to approximately bilateral 20% arterial stenosis. Finding is compatible with 16-49% stenosis per sonographic NASCET index criteria. 2. Bilateral carotid bifurcation atherosclerotic plaque resulting in approximately 30% stenosis of the left internal carotid artery origin. Finding is compatible with 16-49% stenosis per sonographic NASCET index criteria. 3. Incidental finding of severe centrilobular emphysema.  RECOMMENDATIONS: For clinical suspicion of acute ischemia, recommend MRI brain with diffusion-weighted imaging for further evaluation     Ct Chest Abdomen Pelvis W Contrast    Result Date: 2/26/2021 EXAMINATION: CT OF THE CHEST, ABDOMEN, AND PELVIS WITH CONTRAST 2/26/2021 4:06 am TECHNIQUE: CT of the chest, abdomen and pelvis was performed with the administration of intravenous contrast. Multiplanar reformatted images are provided for review. Dose modulation, iterative reconstruction, and/or weight based adjustment of the mA/kV was utilized to reduce the radiation dose to as low as reasonably achievable. COMPARISON: 8/3/2018 HISTORY: ORDERING SYSTEM PROVIDED HISTORY: chest pain TECHNOLOGIST PROVIDED HISTORY: Reason for exam:->chest pain Decision Support Exception->Emergency Medical Condition (MA) Reason for Exam: Chest pains FINDINGS: Chest: Mediastinum: The thyroid gland is unremarkable. Atherosclerotic plaque is noted along the aorta and its branch vessels. The heart size is normal. Coronary artery vascular calcifications are noted. There is a pericardial effusion measuring 9 mm in thickness. There are calcified hilar and mediastinal lymph nodes, compatible with sequela of old granulomatous disease. Lungs/pleura: There is centrilobular emphysema. There is a left upper lobe infiltrate, compatible with pneumonia. There are areas of scarring in the right lung apex. Asymmetric atelectasis is noted in the left lung base. Trace left pleural effusion. Soft Tissues/Bones: There is no appreciable soft tissue swelling. Abdomen/Pelvis: Organs: There is cirrhosis. There is a low-attenuation lesion in the right lobe of the liver measuring 1.4 cm, likely a cyst or hemangioma. There are gallstones without adjacent inflammatory changes. The adrenal glands are unremarkable. The pancreas is atrophic. Calcified splenic granulomas are noted. The kidneys are atrophic. No hydronephrosis or suspicious renal mass. GI/Bowel: The visualized hollow visceral organs, including the appendix, are normal in appearance. There is no bowel obstruction. Pelvis: The bladder is distended with urine.   The prostate and seminal vesicles are unremarkable. There is a right inguinal hernia containing fat. No inguinal or pelvic sidewall adenopathy. Peritoneum/Retroperitoneum: Atherosclerotic plaque is noted in the aorta and its branch vessels. No retroperitoneal adenopathy. No anterior abdominal wall defect. Bones/Soft Tissues: No appreciable soft tissue swelling. Degenerative changes are noted in the spine. L2 vertebroplasty is noted. There is grade 2 anterolisthesis of L4 on L5. There is a transitional L5 type vertebral body. There is chronic wedging of the T12 vertebral body. 1. Left upper lobe pneumonia. Follow-up radiographs are recommended to ensure resolution. There is an associated trace left pleural effusion. 2. Cirrhosis. 3. Cholelithiasis. 4. Atrophic kidneys. 5. Atherosclerotic disease. 6. Pericardial effusion. EKG:   Read by ER in the absence of a Cardiologist shows sinus rhythm with sinus arrhythmia with a ventricular of 117 bpm.  [place] interval is prolonged and QTc interval is prolonged. Patient has normal axis. There are significant ST elevations depressions EKG is nondiagnostic for ACS. Compared EKG from 8/3/2018 did not appreciate significant changes. Assessment:   Principal Problem:    Pneumonia  Active Problems:    Chest pain    Acquired hypothyroidism    COPD (chronic obstructive pulmonary disease) (HCC)    Hypokalemia    Elevated troponin    Sepsis (HCC)    Tachycardia    Tachypnea    Neutrophilic leukocytosis  Resolved Problems:    * No resolved hospital problems. *      Plan:           Pneumonia, organism unspecified - Pt has been started on Levaquin  - Blood cultures x 2 ordered  - Respiratory culture ordered  - Legionella pneumophilia and Strep pneumoniae urine antigens ordered  - Procalcitonin ordered    Chest pain, possibly pleuritic but with increased concern die to elevated troponin. Will follow serial Troponin levels and monitor on Telemetry. Cardiology has been consulted. Sepsis ([facility] [address] due to Pneumonia with tachycardia, tachypnea and neutrophilic leukocytosi. Initial Lactic Acid was not elevated. - Blood cultures x 2 have been ordered    Hypokalemia - replace Potassium and recheck in the am    Acquired hypothyroidism - stable; continue Levothyroxine    COPD (chronic obstructive pulmonary disease) - without acute exacerbation. Will provide Nebulizer treatments as needed and continue home medications. Patient will be monitored closely, and deep breathing and coughing will be encouraged while awake. DVT Prophylaxis: Lovenox  Diet: Diet NPO Effective Now  Code Status: Full Code  (Advanced care planning has been discussed with patient and/or responsible family member and is reflected in the code status.  Further orders associated with this have been entered if appropriate)    Disposition: Anticipate that patient will remain in the hospital for 2 to 3 days depending on further evaluation and clinical course     Please note that over 50 minutes was spent in evaluating the patient, review of records and results, discussion with staff/family, etc.    Ramon Suarez MD

## 2021-02-26 NOTE — ED NOTES
RN called to CT scan because tech stated that patient was not acting normal. Per tech patient was slurring speech and they were not able to understand speech.      Camila Benites RN  02/26/21 [id number]

## 2021-02-26 NOTE — CONSULTS
Pharmacy Note  Vancomycin Consult    Linda Jane is a 80 y.o. male started on Vancomycin for HCAP; consult received from Dr. Karin Cloud to manage therapy. Also receiving the following antibiotics: Cefepime. Allergies:  Patient has no known allergies. Tmax:     Recent Labs     02/26/21  0301   CREATININE 1.4*       Recent Labs     02/26/21  0301   WBC 13.4*       Estimated Creatinine Clearance: 33 mL/min (A) (based on SCr of 1.4 mg/dL (H)). Intake/Output Summary (Last 24 hours) at 2/26/2021 0655  Last data filed at 2/26/2021 0610  Gross per 24 hour   Intake 50 ml   Output    Net 50 ml       Wt Readings from Last 1 Encounters:   02/26/21 155 lb (70.3 kg)         Body mass index is 21.62 kg/m². Culture Date      Source                       Results      Loading dose (critically ill or in ICU, require dialysis or renal replacement therapy): Vancomycin 25 mg/kg IVPB x 1 (maximum 3000 mg). Maintenance dose: 15 mg/kg (maximum: 2000 mg/dose and 4500 mg/day) starting at the next dosing interval determined by renal function  Pulse dose: fluctuating renal function, LONG, ESRD   Goal Vancomycin trough: 15-20 mcg/mL   Goal Vancomycin AUC: 400-600     Assessment/Plan:  Will initiate Vancomycin with a one time loading dose of 1250 mg x 1. Timing of trough level will be determined based on culture results, renal function, and clinical response. Thank you for the consult.   Ana Medel, PharmD  2/26/2021 6:57 AM

## 2021-02-26 NOTE — ED NOTES

## 2021-02-26 NOTE — PROGRESS NOTES
Notified [facility] office, [place], [place] [address] 2/26/21 for cardiac consult. [phone] [facility]

## 2021-02-27 LAB
ANION GAP SERPL CALCULATED.3IONS-SCNC: 10 MMOL/L (ref 3–16)
BASOPHILS ABSOLUTE: 0 K/UL (ref 0–0.2)
BASOPHILS RELATIVE PERCENT: 0.4 %
BUN BLDV-MCNC: 26 MG/DL (ref 7–20)
CALCIUM SERPL-MCNC: 9 MG/DL (ref 8.3–10.6)
CHLORIDE BLD-SCNC: 103 MMOL/L (ref 99–110)
CO2: 24 MMOL/L (ref 21–32)
CREAT SERPL-MCNC: 1.2 MG/DL (ref 0.8–1.3)
EOSINOPHILS ABSOLUTE: 0.1 K/UL (ref 0–0.6)
EOSINOPHILS RELATIVE PERCENT: 0.7 %
GFR AFRICAN AMERICAN: >60
GFR NON-AFRICAN AMERICAN: 57
GLUCOSE BLD-MCNC: 78 MG/DL (ref 70–99)
HCT VFR BLD CALC: 33.1 % (ref 40.5–52.5)
HEMOGLOBIN: 11.5 G/DL (ref 13.5–17.5)
LYMPHOCYTES ABSOLUTE: 0.5 K/UL (ref 1–5.1)
LYMPHOCYTES RELATIVE PERCENT: 4.6 %
MCH RBC QN AUTO: 37.1 PG (ref 26–34)
MCHC RBC AUTO-ENTMCNC: 34.7 G/DL (ref 31–36)
MCV RBC AUTO: 107 FL (ref 80–100)
MONOCYTES ABSOLUTE: 0.9 K/UL (ref 0–1.3)
MONOCYTES RELATIVE PERCENT: 8.6 %
NEUTROPHILS ABSOLUTE: 8.5 K/UL (ref 1.7–7.7)
NEUTROPHILS RELATIVE PERCENT: 85.7 %
PDW BLD-RTO: 14.3 % (ref 12.4–15.4)
PLATELET # BLD: 184 K/UL (ref 135–450)
PMV BLD AUTO: 8.2 FL (ref 5–10.5)
POTASSIUM REFLEX MAGNESIUM: 3.7 MMOL/L (ref 3.5–5.1)
RBC # BLD: 3.09 M/UL (ref 4.2–5.9)
SODIUM BLD-SCNC: 137 MMOL/L (ref 136–145)
WBC # BLD: 9.9 K/UL (ref 4–11)

## 2021-02-27 PROCEDURE — 94640 AIRWAY INHALATION TREATMENT: CPT

## 2021-02-27 PROCEDURE — 85025 COMPLETE CBC W/AUTO DIFF WBC: CPT

## 2021-02-27 PROCEDURE — 6370000000 HC RX 637 (ALT 250 FOR IP): Performed by: INTERNAL MEDICINE

## 2021-02-27 PROCEDURE — 80048 BASIC METABOLIC PNL TOTAL CA: CPT

## 2021-02-27 PROCEDURE — 36415 COLL VENOUS BLD VENIPUNCTURE: CPT

## 2021-02-27 PROCEDURE — 1200000000 HC SEMI PRIVATE

## 2021-02-27 PROCEDURE — 2580000003 HC RX 258: Performed by: INTERNAL MEDICINE

## 2021-02-27 PROCEDURE — 6360000002 HC RX W HCPCS: Performed by: INTERNAL MEDICINE

## 2021-02-27 RX ORDER — KETOROLAC TROMETHAMINE 30 MG/ML
15 INJECTION, SOLUTION INTRAMUSCULAR; INTRAVENOUS ONCE
Status: DISCONTINUED | OUTPATIENT
Start: 2021-02-27 | End: 2021-03-03

## 2021-02-27 RX ADMIN — SENNOSIDES 8.6 MG: 8.6 TABLET, FILM COATED ORAL at 09:10

## 2021-02-27 RX ADMIN — Medication 2 PUFF: at 08:04

## 2021-02-27 RX ADMIN — SODIUM CHLORIDE, PRESERVATIVE FREE 10 ML: 5 INJECTION INTRAVENOUS at 12:59

## 2021-02-27 RX ADMIN — Medication 2 PUFF: at 19:43

## 2021-02-27 RX ADMIN — ENOXAPARIN SODIUM 40 MG: 40 INJECTION SUBCUTANEOUS at 09:10

## 2021-02-27 RX ADMIN — MIRTAZAPINE 30 MG: 15 TABLET, FILM COATED ORAL at 21:08

## 2021-02-27 RX ADMIN — FOLIC ACID 1 MG: 1 TABLET ORAL at 09:10

## 2021-02-27 RX ADMIN — LEVOFLOXACIN 500 MG: 5 INJECTION, SOLUTION INTRAVENOUS at 12:59

## 2021-02-27 RX ADMIN — SODIUM CHLORIDE, PRESERVATIVE FREE 10 ML: 5 INJECTION INTRAVENOUS at 21:28

## 2021-02-27 RX ADMIN — TIOTROPIUM BROMIDE INHALATION SPRAY 2 PUFF: 3.12 SPRAY, METERED RESPIRATORY (INHALATION) at 08:05

## 2021-02-27 RX ADMIN — TAMSULOSIN HYDROCHLORIDE 0.4 MG: 0.4 CAPSULE ORAL at 21:08

## 2021-02-27 RX ADMIN — SENNOSIDES 8.6 MG: 8.6 TABLET, FILM COATED ORAL at 21:08

## 2021-02-27 RX ADMIN — LEVOTHYROXINE SODIUM 75 MCG: 0.05 TABLET ORAL at 06:41

## 2021-02-27 RX ADMIN — FINASTERIDE 5 MG: 5 TABLET, FILM COATED ORAL at 09:10

## 2021-02-27 ASSESSMENT — PAIN SCALES - GENERAL: PAINLEVEL_OUTOF10: 0

## 2021-02-27 NOTE — FLOWSHEET NOTE
Follow up; brought Silvinay Raviion to pt. Daughter Deepak Haile was present at bedside. 02/27/21 1511   Encounter Summary   Services provided to: Patient and family together   Referral/Consult From: Patient; Other    Support System Children;Family members   Continue Visiting Yes  (2/27 brought Holy Communion)   Complexity of Encounter Moderate   Length of Encounter 30 minutes   Spiritual/Yazidism   Type Spiritual support   Assessment Approachable;Peaceful   Intervention Active listening;Explored feelings, thoughts, concerns;Prayer   Outcome Expressed gratitude;Engaged in conversation   Sacraments   Communion Patient received communion

## 2021-02-27 NOTE — PROGRESS NOTES
Hospitalist Progress Note      PCP: Jonn Rico MD    Date of Admission: 2/26/2021    Chief Complaint: Pt c/o left sided chest pain with radiation into the left arm. Hospital Course: 80y.o. year-old male PMH COPD, Hypothyroidism, Glaucoma, Macular degeneration and BPH presents from his nursing home for evaluation following an acute onset of sharp, stabbing pain in his left chest wall that radiates to his left arm. The pain is present constantly and is worse when he takes a deep breath. In the ER he was found to have Pneumonia, associated sepsis and an elevated troponin. His EKG had no ischemic changes. Associated signs and symptoms do not include fever or chills, nausea, vomiting, abdominal pain, hemoptysis, hematochezia, diarrhea, constipation or urinary symptoms. CT CAP shows cirrhosis, left upper lobe pneumonia with an associated left pleural effusion , atrophic kidneys and pericardial effusion (didnt specify how big). WBC 13.4 . ddimer 760. CV19 neg. Was started on levaquin. Troponin 0.03 x3 seen by cardiology doesn't warrant further workup feels the chest pain probably more pleurisy     Subjective: daughter stets he is very confused he is asking how he got here doesn't seem to know where he is. Then they talked about he had chest pain yesterday he says its still there if he coughs    Hallucinating, says he sees a wall of maps. Then stopped and said wow did the lights just come on brighter. He kind of stopped in midst of deep breath due to left chest pain on exam     History of back issue . Doesn't walk much, back pain can get worse if walking and standing he can usually do self transfers and has learned his way to the bathroom. Gets assistance with showers. He has a system developed for how he dresses. Gets OT PT there.  [facility] [place]   All other systems neg       Medications:  Reviewed    Infusion Medications   Scheduled Medications    sodium chloride  1,000 mL Intravenous Once  fentanNYL  25 mcg Intravenous Once    finasteride  5 mg Oral Daily    budesonide-formoterol  2 puff Inhalation BID    folic acid  1 mg Oral Daily    levothyroxine  75 mcg Oral Daily    mirtazapine  30 mg Oral Nightly    senna  1 tablet Oral BID    tamsulosin  0.4 mg Oral Nightly    tiotropium  2 puff Inhalation Daily    sodium chloride flush  10 mL Intravenous 2 times per day    enoxaparin  40 mg Subcutaneous Daily    levofloxacin  500 mg Intravenous Q24H     PRN Meds: sodium chloride flush, promethazine **OR** ondansetron, polyethylene glycol, acetaminophen **OR** acetaminophen, perflutren lipid microspheres, traMADol      Intake/Output Summary (Last 24 hours) at 2/27/2021 1715  Last data filed at 2/27/2021 1416  Gross per 24 hour   Intake 280 ml   Output 675 ml   Net -395 ml       Physical Exam Performed:    BP (!) 115/50   Pulse 93   Temp 97.9 °F (36.6 °C) (Oral)   Resp 18   Ht 5' 11\" (1.803 m)   Wt 130 lb 1.1 oz (59 kg)   SpO2 93%   BMI 18.14 kg/m²     General appearance: No distress, appears stated age and cooperative. HEENT: Pupils equal, round, and reactive to light. Looking straight ahead . Neck: Supple, full range of motion. No jugular venous distention. Trachea midline. Respiratory:  Normal effort. Clear to auscultation, he stopped and flinched with deep breath   Cardiovascular: Regular rate and rhythm with normal S1/S2 without murmurs, rubs or gallops. Abdomen: Soft, non-tender, non-distended with normal bowel sounds. Musculoskeletal: No clubbing, cyanosis or edema bilaterally. Full range of motion without deformity. Skin: Skin color, texture, turgor normal.  No rashes or lesions. Neurologic:  .  Cranial nerves: II-XII intact, grossly non-focal. [facility]   Psychiatric: Alert and oriented, thought content appropriate, normal insight  Using urinal and at anthology he uses one   Peripheral Pulses: +2 palpable, equal bilaterally       Labs:   Recent Labs     02/26/21  0301 02/27/21 0529   WBC 13.4* 9.9   HGB 12.4* 11.5*   HCT 36.4* 33.1*    184     Recent Labs     02/26/21  0301 02/27/21  0529   * 137   K 3.4* 3.7   CL 97* 103   CO2 23 24   BUN 33* 26*   CREATININE 1.4* 1.2   CALCIUM 9.4 9.0     Recent Labs     02/26/21  0301   AST 40*   ALT 33   BILITOT 3.4*   ALKPHOS 76     No results for input(s): INR in the last 72 hours. Recent Labs     02/26/21  0301 02/26/21  0852 02/26/21  1122   TROPONINI 0.03* 0.03* 0.03*       Urinalysis:      Lab Results   Component Value Date    NITRU Negative 04/23/2016    BLOODU Negative 04/23/2016    SPECGRAV 1.015 04/23/2016    GLUCOSEU Negative 04/23/2016       Radiology:  CTA HEAD NECK W CONTRAST   Final Result   1. Bilateral internal carotid artery cavernous segment scattered   atherosclerotic calcification resulting up to approximately bilateral 20%   arterial stenosis. Finding is compatible with 16-49% stenosis per   sonographic NASCET index criteria. 2. Bilateral carotid bifurcation atherosclerotic plaque resulting in   approximately 30% stenosis of the left internal carotid artery origin. Finding is compatible with 16-49% stenosis per sonographic NASCET index   criteria. 3. Incidental finding of severe centrilobular emphysema. RECOMMENDATIONS:   For clinical suspicion of acute ischemia, recommend MRI brain with   diffusion-weighted imaging for further evaluation         CT CHEST ABDOMEN PELVIS W CONTRAST   Final Result   1. Left upper lobe pneumonia. Follow-up radiographs are recommended to   ensure resolution. There is an associated trace left pleural effusion. 2. Cirrhosis. 3. Cholelithiasis. 4. Atrophic kidneys. 5. Atherosclerotic disease. 6. Pericardial effusion. XR CHEST PORTABLE   Final Result   1. Left mid lung infiltrate, concerning for pneumonia. Follow-up radiographs   are recommended to ensure resolution. 2. Emphysema.            Echo:2/20/2019

## 2021-02-28 LAB
ANION GAP SERPL CALCULATED.3IONS-SCNC: 9 MMOL/L (ref 3–16)
BACTERIA: ABNORMAL /HPF
BASE EXCESS ARTERIAL: -1.2 MMOL/L (ref -3–3)
BASOPHILS ABSOLUTE: 0.1 K/UL (ref 0–0.2)
BASOPHILS RELATIVE PERCENT: 0.6 %
BILIRUBIN URINE: ABNORMAL
BLOOD, URINE: ABNORMAL
BUN BLDV-MCNC: 19 MG/DL (ref 7–20)
CALCIUM SERPL-MCNC: 9.3 MG/DL (ref 8.3–10.6)
CARBOXYHEMOGLOBIN ARTERIAL: 0.6 % (ref 0–1.5)
CHLORIDE BLD-SCNC: 102 MMOL/L (ref 99–110)
CLARITY: CLEAR
CO2: 23 MMOL/L (ref 21–32)
COLOR: YELLOW
CREAT SERPL-MCNC: 1.2 MG/DL (ref 0.8–1.3)
EOSINOPHILS ABSOLUTE: 0 K/UL (ref 0–0.6)
EOSINOPHILS RELATIVE PERCENT: 0.5 %
EPITHELIAL CELLS, UA: ABNORMAL /HPF (ref 0–5)
GFR AFRICAN AMERICAN: >60
GFR NON-AFRICAN AMERICAN: 57
GLUCOSE BLD-MCNC: 72 MG/DL (ref 70–99)
GLUCOSE URINE: NEGATIVE MG/DL
HCO3 ARTERIAL: 21 MMOL/L (ref 21–29)
HCT VFR BLD CALC: 32 % (ref 40.5–52.5)
HEMOGLOBIN, ART, EXTENDED: 11.6 G/DL (ref 13.5–17.5)
HEMOGLOBIN: 11.2 G/DL (ref 13.5–17.5)
HYALINE CASTS: ABNORMAL /LPF (ref 0–2)
KETONES, URINE: 15 MG/DL
LEUKOCYTE ESTERASE, URINE: NEGATIVE
LYMPHOCYTES ABSOLUTE: 0.5 K/UL (ref 1–5.1)
LYMPHOCYTES RELATIVE PERCENT: 5.4 %
MAGNESIUM: 1.5 MG/DL (ref 1.8–2.4)
MCH RBC QN AUTO: 37 PG (ref 26–34)
MCHC RBC AUTO-ENTMCNC: 35 G/DL (ref 31–36)
MCV RBC AUTO: 105.7 FL (ref 80–100)
METHEMOGLOBIN ARTERIAL: 0.5 %
MICROSCOPIC EXAMINATION: YES
MONOCYTES ABSOLUTE: 1 K/UL (ref 0–1.3)
MONOCYTES RELATIVE PERCENT: 9.6 %
MUCUS: ABNORMAL /LPF
NEUTROPHILS ABSOLUTE: 8.4 K/UL (ref 1.7–7.7)
NEUTROPHILS RELATIVE PERCENT: 83.9 %
NITRITE, URINE: NEGATIVE
O2 CONTENT ARTERIAL: 15 ML/DL
O2 SAT, ARTERIAL: 90 %
O2 THERAPY: ABNORMAL
PCO2 ARTERIAL: 27.7 MMHG (ref 35–45)
PDW BLD-RTO: 13.9 % (ref 12.4–15.4)
PH ARTERIAL: 7.5 (ref 7.35–7.45)
PH UA: 5.5 (ref 5–8)
PLATELET # BLD: 220 K/UL (ref 135–450)
PMV BLD AUTO: 8.3 FL (ref 5–10.5)
PO2 ARTERIAL: 53.8 MMHG (ref 75–108)
POTASSIUM REFLEX MAGNESIUM: 3.5 MMOL/L (ref 3.5–5.1)
PROTEIN UA: NEGATIVE MG/DL
RBC # BLD: 3.03 M/UL (ref 4.2–5.9)
RBC UA: ABNORMAL /HPF (ref 0–4)
SODIUM BLD-SCNC: 134 MMOL/L (ref 136–145)
SPECIFIC GRAVITY UA: 1.02 (ref 1–1.03)
TCO2 ARTERIAL: 21.8 MMOL/L
URINE REFLEX TO CULTURE: ABNORMAL
URINE TYPE: ABNORMAL
UROBILINOGEN, URINE: 1 E.U./DL
WBC # BLD: 10 K/UL (ref 4–11)
WBC UA: ABNORMAL /HPF (ref 0–5)

## 2021-02-28 PROCEDURE — 87449 NOS EACH ORGANISM AG IA: CPT

## 2021-02-28 PROCEDURE — 97530 THERAPEUTIC ACTIVITIES: CPT

## 2021-02-28 PROCEDURE — 85025 COMPLETE CBC W/AUTO DIFF WBC: CPT

## 2021-02-28 PROCEDURE — 97166 OT EVAL MOD COMPLEX 45 MIN: CPT

## 2021-02-28 PROCEDURE — 94640 AIRWAY INHALATION TREATMENT: CPT

## 2021-02-28 PROCEDURE — 82803 BLOOD GASES ANY COMBINATION: CPT

## 2021-02-28 PROCEDURE — 2580000003 HC RX 258: Performed by: INTERNAL MEDICINE

## 2021-02-28 PROCEDURE — 1200000000 HC SEMI PRIVATE

## 2021-02-28 PROCEDURE — 6370000000 HC RX 637 (ALT 250 FOR IP): Performed by: INTERNAL MEDICINE

## 2021-02-28 PROCEDURE — 6360000002 HC RX W HCPCS: Performed by: HOSPITALIST

## 2021-02-28 PROCEDURE — 6360000002 HC RX W HCPCS: Performed by: INTERNAL MEDICINE

## 2021-02-28 PROCEDURE — 36415 COLL VENOUS BLD VENIPUNCTURE: CPT

## 2021-02-28 PROCEDURE — 81001 URINALYSIS AUTO W/SCOPE: CPT

## 2021-02-28 PROCEDURE — 6370000000 HC RX 637 (ALT 250 FOR IP): Performed by: HOSPITALIST

## 2021-02-28 PROCEDURE — 80048 BASIC METABOLIC PNL TOTAL CA: CPT

## 2021-02-28 PROCEDURE — 87086 URINE CULTURE/COLONY COUNT: CPT

## 2021-02-28 PROCEDURE — 36600 WITHDRAWAL OF ARTERIAL BLOOD: CPT

## 2021-02-28 PROCEDURE — 83735 ASSAY OF MAGNESIUM: CPT

## 2021-02-28 PROCEDURE — 97162 PT EVAL MOD COMPLEX 30 MIN: CPT

## 2021-02-28 RX ORDER — POTASSIUM CHLORIDE 20 MEQ/1
40 TABLET, EXTENDED RELEASE ORAL PRN
Status: DISCONTINUED | OUTPATIENT
Start: 2021-02-28 | End: 2021-03-03

## 2021-02-28 RX ORDER — MAGNESIUM SULFATE IN WATER 40 MG/ML
2000 INJECTION, SOLUTION INTRAVENOUS ONCE
Status: COMPLETED | OUTPATIENT
Start: 2021-02-28 | End: 2021-02-28

## 2021-02-28 RX ORDER — POTASSIUM CHLORIDE 7.45 MG/ML
10 INJECTION INTRAVENOUS PRN
Status: DISCONTINUED | OUTPATIENT
Start: 2021-02-28 | End: 2021-03-03

## 2021-02-28 RX ORDER — HALOPERIDOL 5 MG/ML
2 INJECTION INTRAMUSCULAR EVERY 6 HOURS PRN
Status: DISCONTINUED | OUTPATIENT
Start: 2021-02-28 | End: 2021-03-01

## 2021-02-28 RX ORDER — OLANZAPINE 5 MG/1
5 TABLET ORAL NIGHTLY
Status: DISCONTINUED | OUTPATIENT
Start: 2021-02-28 | End: 2021-02-28

## 2021-02-28 RX ORDER — QUETIAPINE FUMARATE 25 MG/1
25 TABLET, FILM COATED ORAL NIGHTLY
Status: DISCONTINUED | OUTPATIENT
Start: 2021-02-28 | End: 2021-03-01

## 2021-02-28 RX ADMIN — FINASTERIDE 5 MG: 5 TABLET, FILM COATED ORAL at 09:45

## 2021-02-28 RX ADMIN — SENNOSIDES 8.6 MG: 8.6 TABLET, FILM COATED ORAL at 09:45

## 2021-02-28 RX ADMIN — MAGNESIUM SULFATE HEPTAHYDRATE 2000 MG: 40 INJECTION, SOLUTION INTRAVENOUS at 20:12

## 2021-02-28 RX ADMIN — ENOXAPARIN SODIUM 40 MG: 40 INJECTION SUBCUTANEOUS at 09:43

## 2021-02-28 RX ADMIN — HALOPERIDOL LACTATE 2 MG: 5 INJECTION, SOLUTION INTRAMUSCULAR at 20:47

## 2021-02-28 RX ADMIN — FOLIC ACID 1 MG: 1 TABLET ORAL at 09:44

## 2021-02-28 RX ADMIN — QUETIAPINE FUMARATE 25 MG: 25 TABLET ORAL at 22:49

## 2021-02-28 RX ADMIN — MIRTAZAPINE 30 MG: 15 TABLET, FILM COATED ORAL at 22:50

## 2021-02-28 RX ADMIN — HALOPERIDOL LACTATE 2 MG: 5 INJECTION, SOLUTION INTRAMUSCULAR at 15:51

## 2021-02-28 RX ADMIN — LEVOFLOXACIN 500 MG: 5 INJECTION, SOLUTION INTRAVENOUS at 12:13

## 2021-02-28 RX ADMIN — SODIUM CHLORIDE, PRESERVATIVE FREE 10 ML: 5 INJECTION INTRAVENOUS at 09:45

## 2021-02-28 RX ADMIN — LEVOTHYROXINE SODIUM 75 MCG: 0.05 TABLET ORAL at 09:44

## 2021-02-28 RX ADMIN — TIOTROPIUM BROMIDE INHALATION SPRAY 2 PUFF: 3.12 SPRAY, METERED RESPIRATORY (INHALATION) at 08:59

## 2021-02-28 RX ADMIN — Medication 2 PUFF: at 08:59

## 2021-02-28 ASSESSMENT — PAIN SCALES - GENERAL
PAINLEVEL_OUTOF10: 0
PAINLEVEL_OUTOF10: 3

## 2021-02-28 NOTE — PROGRESS NOTES
Occupational Therapy   Occupational Therapy Initial Assessment  Date: 2021   Patient Name: Slime Birch  MRN: [id number]     : 1929    Date of Service: 2021    Discharge Recommendations:  Subacute/Skilled Nursing Facility       Assessment   Performance deficits / Impairments: Decreased functional mobility ; Decreased safe awareness;Decreased balance;Decreased ADL status; Decreased cognition;Decreased sensation;Decreased fine motor control  Assessment: per chart review pt normally transfers with min assist & can feed self independently; pt with confusion, increased agitation, requiring max assist with stand -pivot transfers & max assist with light ADL's, unable to maintain grasp; pt to benefit from skilled OT services  OT Education: OT Role;Precautions;Orientation  Patient Education: disease specific:  importance of use of call light for assist with transfers/ADL needs  Barriers to Learning: [place],blind, confusion  REQUIRES OT FOLLOW UP: Yes  Activity Tolerance  Activity Tolerance: Treatment limited secondary to decreased cognition;Treatment limited secondary to agitation  Activity Tolerance: increased agitation when corrected about situation; semi-iniguez's in bed after bedside commode transfers:  BP = 122/73,HR = 115, 93 % O 2 sats on 1 L O 2;  Safety Devices  Safety Devices in place: Yes  Type of devices: Bed alarm in place;Call light within reach; Left in bed;Nurse notified           Patient Diagnosis(es): The primary encounter diagnosis was Chest pain, unspecified type. Diagnoses of Pneumonia due to organism and Elevated troponin were also pertinent to this visit. has a past medical history of BPH (benign prostatic hyperplasia), Decreased vision, Dysphagia, Enlarged prostate, Glaucoma, Macular degeneration, and Thyroid disease. has a past surgical history that includes Tonsillectomy; eye surgery (Bilateral); eye surgery (Right, 6/13/14); back surgery; and Upper gastrointestinal endoscopy (04/20/2017).            Restrictions  Restrictions/Precautions  Restrictions/Precautions: General Precautions, Fall Risk  Position Activity Restriction  Other position/activity restrictions: 1 L O 2, High fall risk    Subjective   General  Chart Reviewed: Yes  Patient assessed for rehabilitation services?: Yes  Family / Caregiver Present: No  Referring Practitioner: Dr. Dianne Liu  Diagnosis: chest pain, altered mental status  Subjective  Subjective: thoughts scattered,seeing people & objects in room, feels someone pushing him  General Comment  Comments: RN cleared pt for OT eval; pt sitting EOB, requesting to go to bathroom  Patient Currently in Pain: Denies    Social/Functional History  Social/Functional History  Lives With: Alone  Type of Home: Assisted living([facility])  Receives Help From: Personal care attendant  ADL Assistance: Needs assistance  Bath: Maximum assistance  Dressing: Maximum assistance  Grooming: Maximum assistance  Homemaking Responsibilities: No  Ambulation Assistance: Needs assistance  Transfer Assistance: Needs assistance  Additional Comments: info obtained from case management note       Objective   Vision: Impaired  Vision Exceptions: Legally blind  Hearing: Exceptions to [facility]  Hearing Exceptions: Hard of hearing/hearing concerns    Orientation  Overall Orientation Status: Impaired  Orientation Level: Oriented to person;Disoriented to time;Disoriented to situation;Disoriented to place     Balance  Sitting Balance: Minimal assistance  Standing Balance: Maximum assistance(bilateral hand held assist with gait belt)  Standing Balance  Activity: bed<-->BSC  Comment: unable to walk with assist of 2 with RW  Toilet Transfers  Toilet - Technique: Stand pivot  Equipment Used: Extra wide bedside commode  Toilet Transfer: Maximum assistance ADL  Feeding: Moderate assistance  Toileting: Moderate assistance    RUE Tone: Normotonic  LUE Tone: Normotonic  Coordination  Movements Are Fluid And Coordinated: No  Coordination and Movement description: Fine motor impairments; Left UE;Right UE; Apraxia(or neuropathy? ? )     Bed mobility  Supine to Sit: Unable to assess(already sitting EOB upon entering room)  Sit to Supine: Maximum assistance  Transfers  Sit to stand: Maximum assistance  Stand to sit: Maximum assistance  Vision - Basic Assessment  Prior Vision: Blind  Visual History: Macular degeneration;Glaucoma  Patient Visual Report: Balance difficulty  Cognition  Overall Cognitive Status: Exceptions  Arousal/Alertness: Delayed responses to stimuli  Following Commands: Inconsistently follows commands  Attention Span: Difficulty attending to directions  Memory: Decreased recall of precautions;Decreased short term memory;Decreased recall of recent events;Decreased long term memory  Safety Judgement: Decreased awareness of need for safety;Decreased awareness of need for assistance  Problem Solving: Assistance required to implement solutions  Insights: Not aware of deficits  Initiation: Requires cues for some  Sequencing: Requires cues for all        Sensation  Overall Sensation Status: Impaired  Stereognosis: Severe deficits in the RUE;Severe deficits in the LUE        LUE General AROM: WFL elbow-->hand by observation  RUE General AROM: WFL elbow-->hand by observation                      Plan   Plan  Times per week: 2-4x/ week  Current Treatment Recommendations: Functional Mobility Training, Safety Education & Training, Self-Care / ADL             AM-PAC Score        AM-[facility] Inpatient Daily Activity Raw Score: 10 (02/28/21 1152)  AM-PAC Inpatient ADL T-Scale Score : 27.31 (02/28/21 1152)  ADL Inpatient CMS 0-100% Score: 74.7 (02/28/21 1152)  ADL Inpatient CMS G-Code Modifier : CL (02/28/21 1152)    Goals  Short term goals Time Frame for Short term goals: 1 week (3-07-21)  Short term goal 1: mod assist of 1 with stand-pivot transfers by 3-7-21  Short term goal 2: min assist with feeding self by 3-05-21  Patient Goals   Patient goals : unable to identify       Therapy Time   Individual Concurrent Group Co-treatment   Time In 1021         Time Out 1048         Minutes 1975 Stefanie Rivera, Martin Child

## 2021-02-28 NOTE — CONSULTS
2/28/2021  [facility]    Reason for Consult:  Urinary retention, unable to place preston  Requesting Physician:  Ann Sánchez      History Obtained From:  patient    HISTORY OF PRESENT ILLNESS:                The patient is a 80 y.o. male who presents with multiple medical issues, he is very confused and unable to void. unable to get any further history.     Past Medical History:        Diagnosis Date    BPH (benign prostatic hyperplasia)     Decreased vision     left    Dysphagia     Enlarged prostate     Glaucoma     Macular degeneration     Thyroid disease     hypothyroid     Past Surgical History:        Procedure Laterality Date    BACK SURGERY      insertion of cement due to crushed vertebrae    EYE SURGERY Bilateral     cataracts    EYE SURGERY Right 6/13/14    [place]    TONSILLECTOMY      UPPER GASTROINTESTINAL ENDOSCOPY  04/20/2017    dilated esophagus     Current Medications:   Current Facility-Administered Medications: magnesium sulfate 2000 mg in 50 mL IVPB premix, 2,000 mg, Intravenous, Once  potassium chloride (KLOR-CON M) extended release tablet 40 mEq, 40 mEq, Oral, PRN **OR** potassium bicarb-citric acid (EFFER-K) effervescent tablet 40 mEq, 40 mEq, Oral, PRN **OR** potassium chloride 10 mEq/100 mL IVPB (Peripheral Line), 10 mEq, Intravenous, PRN  haloperidol lactate (HALDOL) injection 2 mg, 2 mg, Intravenous, Q6H PRN  QUEtiapine (SEROQUEL) tablet 25 mg, 25 mg, Oral, Nightly  ketorolac (TORADOL) injection 15 mg, 15 mg, Intravenous, Once  0.9 % sodium chloride bolus, 1,000 mL, Intravenous, Once  fentaNYL (SUBLIMAZE) injection 25 mcg, 25 mcg, Intravenous, Once  finasteride (PROSCAR) tablet 5 mg, 5 mg, Oral, Daily  budesonide-formoterol (SYMBICORT) 160-4.5 MCG/ACT inhaler 2 puff, 2 puff, Inhalation, BID  folic acid (FOLVITE) tablet 1 mg, 1 mg, Oral, Daily  levothyroxine (SYNTHROID) tablet 75 mcg, 75 mcg, Oral, Daily mirtazapine (REMERON) tablet 30 mg, 30 mg, Oral, Nightly  senna (SENOKOT) tablet 8.6 mg, 1 tablet, Oral, BID  tamsulosin (FLOMAX) capsule 0.4 mg, 0.4 mg, Oral, Nightly  tiotropium (SPIRIVA RESPIMAT) 2.5 MCG/ACT inhaler 2 puff, 2 puff, Inhalation, Daily  sodium chloride flush 0.9 % injection 10 mL, 10 mL, Intravenous, 2 times per day  sodium chloride flush 0.9 % injection 10 mL, 10 mL, Intravenous, PRN  enoxaparin (LOVENOX) injection 40 mg, 40 mg, Subcutaneous, Daily  promethazine (PHENERGAN) tablet 12.5 mg, 12.5 mg, Oral, Q6H PRN **OR** ondansetron (ZOFRAN) injection 4 mg, 4 mg, Intravenous, Q6H PRN  polyethylene glycol (GLYCOLAX) packet 17 g, 17 g, Oral, Daily PRN  acetaminophen (TYLENOL) tablet 650 mg, 650 mg, Oral, Q6H PRN **OR** acetaminophen (TYLENOL) suppository 650 mg, 650 mg, Rectal, Q6H PRN  levoFLOXacin (LEVAQUIN) 500 MG/100ML infusion 500 mg, 500 mg, Intravenous, Q24H  perflutren lipid microspheres (DEFINITY) injection 1.65 mg, 1.5 mL, Intravenous, ONCE PRN  traMADol (ULTRAM) tablet 50 mg, 50 mg, Oral, Q6H PRN  Allergies:  No Known Allergies  Social History:  Reviewed, non contributory    Family History:  Reviewed, non contributory      REVIEW OF SYSTEMS:    Unable to assess secondary to mental status    PHYSICAL EXAM:    VITALS:  BP (!) 97/52   Pulse 101   Temp 97.6 °F (36.4 °C) (Axillary)   Resp 19   Ht 5' 11\" (1.803 m)   Wt 130 lb 1.1 oz (59 kg)   SpO2 94%   BMI 18.14 kg/m²   H&N: Sclera normal, no masses, trachea midline, no bruit  CVS: Normal rate and rhythm, no murmurs or rubs, peripheral pulses equal, no clubbing or cyanosis. RESP: Breath sounds equal bilateral, few rhonchi. ABDO: Soft, non-tender, bowel sounds active, no organomegaly, no hernias. LYMPH:  No lymphadenopathy. Skin: Warm dry and intact. : No CVAT, normal external genitalia, no discharge. MSK: Grossly normal for patient  GEGE: Grossly normal for patient  PSY: No acute changes noted in psychosocial assessment.     DATA: Old records have not been requested    IMPRESSION/RECOMMENDATIONS:      Pt unable to void , had pvr 350 cc , nurses unable to place preston. I placed 16 fr coude preston and secured to hopefully present him from pulling out. I would recommend removing as soon as his condition improves    Thank you for asking me to see this interesting patient.     [facility]

## 2021-02-28 NOTE — PROGRESS NOTES
Physical Therapy    Facility/Department: [facility]  Initial Assessment    NAME: Steve Mora  : 1929  MRN: [id number]    Date of Service: 2021    Discharge Recommendations:  Subacute/Skilled Nursing Facility   PT Equipment Recommendations  Equipment Needed: No(defer to facility)    Assessment   Body structures, Functions, Activity limitations: Decreased functional mobility ; Decreased safe awareness;Decreased balance;Decreased cognition;Decreased ROM; Decreased endurance;Decreased posture;Decreased strength  Assessment: Patient is a 80 y.o male presenting to hospital with deficits in functional mobility compared to baseline level of function d/t impairments in strength, balance, activity tolerance and impaired cognition. Patient will continue to benefit from skilled PT services to maximize return to PLOF, reduce risk of falls and allow for safe d/c to SNF level of care when medically appropriate. Treatment Diagnosis: functional mobility deficit  Specific instructions for Next Treatment: progress transfers and gait as able  Prognosis: Fair  Decision Making: Medium Complexity  PT Education: Goals;Transfer Training;PT Role;Equipment;Disease Specific Education; Functional Mobility Training;Plan of Care;Orientation;Gait Training  Patient Education: Pt requires reinforcement  Barriers to Learning: cognition; impaired vision  REQUIRES PT FOLLOW UP: Yes  Activity Tolerance  Activity Tolerance: Patient limited by cognitive status; Patient limited by fatigue;Patient Tolerated treatment well       Patient Diagnosis(es): The primary encounter diagnosis was Chest pain, unspecified type. Diagnoses of Pneumonia due to organism and Elevated troponin were also pertinent to this visit. has a past medical history of BPH (benign prostatic hyperplasia), Decreased vision, Dysphagia, Enlarged prostate, Glaucoma, Macular degeneration, and Thyroid disease. has a past surgical history that includes Tonsillectomy; eye surgery (Bilateral); eye surgery (Right, 6/13/14); back surgery; and Upper gastrointestinal endoscopy (04/20/2017). Restrictions  Restrictions/Precautions  Restrictions/Precautions: General Precautions, Fall Risk  Position Activity Restriction  Other position/activity restrictions: 1 L O 2, High fall risk; Up with assist  Vision/Hearing  Vision: Impaired  Vision Exceptions: Legally blind  Hearing: Exceptions to [facility]  Hearing Exceptions: Hard of hearing/hearing concerns     Subjective  General  Chart Reviewed: Yes  Patient assessed for rehabilitation services?: Yes  Additional Pertinent Hx: COPD, Hypothyroidism, Glaucoma, Macular degeneration and BPH  Response To Previous Treatment: Not applicable  Family / Caregiver Present: No  Referring Practitioner: Ralph Crain MD  Referral Date : 02/27/21  Diagnosis: 80 y.o male presenting from nursing home with sharp L chest pain radiating in LUE. Admitted with pneumonia, associated sepsis and an elevated troponin (0.03 x3). His EKG had no ischemic changes  Follows Commands: Impaired  General Comment  Comments: Pt was sitting EOB upon arrival to room and requesting to use bathroom  Pain Screening  Patient Currently in Pain: Denies  Vital Signs  Patient Currently in Pain: Denies       Orientation  Orientation  Overall Orientation Status: Impaired  Orientation Level: Oriented to person;Disoriented to time;Disoriented to place; Disoriented to situation  Social/Functional History  Social/Functional History  Lives With: Alone  Type of Home: Assisted living([facility])  Receives Help From: Personal care attendant  ADL Assistance: Needs assistance  Bath: Maximum assistance  Dressing: Maximum assistance  Grooming: Maximum assistance  Homemaking Responsibilities: No  Ambulation Assistance: Needs assistance  Transfer Assistance: Needs assistance  Additional Comments: info obtained from case management note  Cognition Cognition  Overall Cognitive Status: Exceptions  Arousal/Alertness: Delayed responses to stimuli  Following Commands: Inconsistently follows commands  Attention Span: Difficulty attending to directions  Memory: Decreased recall of precautions;Decreased short term memory;Decreased recall of recent events;Decreased long term memory  Safety Judgement: Decreased awareness of need for safety;Decreased awareness of need for assistance  Problem Solving: Assistance required to implement solutions  Insights: Not aware of deficits  Initiation: Requires cues for some  Sequencing: Requires cues for all    Objective     Observation/Palpation  Posture: Poor    AROM RLE (degrees)  RLE General AROM: Pt with flexed posture at hips and knee in stance and ankle DF to about neutral  AROM LLE (degrees)  LLE General AROM: Pt with flexed posture at hips and knee in stance and ankle DF to about neutral  Strength RLE  Comment: grossly 3+/5 per mobility; unable to formally assess  Strength LLE  Comment: grossly 3+/5 per mobility; unable to formally assess  Tone RLE  RLE Tone: Normotonic  Tone LLE  LLE Tone: Normotonic  Motor Control  Gross Motor?: Exceptions  Comments: Pt with poor motor planning with all mobility; impaired vision likley contributing  Sensation  Overall Sensation Status: Impaired(Pt with notable deficits in B hands with attempts to manipulate a kleenex; unable to formally assess BLEs)  Bed mobility  Supine to Sit: Unable to assess(Pt sitting on EOB upon arrival to room)  Sit to Supine: Maximum assistance(tactile and verbal cues to initiate and for sequence with assist for trunk and LE management)  Transfers  Sit to Stand: Moderate Assistance(from EOB x 2 and from [facility] up to RW and w/ HHA)  Stand to sit: Moderate Assistance  Bed to Chair: Maximum assistance(bed to [facility] with assist to guide hips; Pt reaching for armrests on [facility])  Stand Pivot Transfers:  Moderate Assistance(stand step with B HHA toward right; cues for sequence) Ambulation  Ambulation?: Yes  More Ambulation?: No  Ambulation 1  Surface: level tile  Device: Rolling Walker  Assistance: Maximum assistance  Quality of Gait: flexed shuffling gait pattern with narrow ROSE and intermittent posterior lean  Gait Deviations: Decreased step length;Decreased step height;Slow Caren  Distance: 3 ft forward backward  Comments: Pt requiring max cues and assist to guide Rw d/t impaired vision; Pt trying to sit after ambulating 5 ft forward with Mod A requiring Max A to guide hips and facilitate step sequence back to bed     Balance  Posture: Poor  Sitting - Static: Fair  Sitting - Dynamic: Fair  Standing - Static: Fair;-  Standing - Dynamic: Poor        Plan   Plan  Times per week: 3-5 x/wk  Times per day: Daily  Specific instructions for Next Treatment: progress transfers and gait as able  Current Treatment Recommendations: Strengthening, Transfer Training, Patient/Caregiver Education & Training, Neuromuscular Re-education, Equipment Evaluation, Education, & procurement, Wheelchair Mobility Training, Balance Training, Gait Training, Home Exercise Program, Safety Education & Training, Functional Mobility Training  Safety Devices  Type of devices:  All fall risk precautions in place, Gait belt, Bed alarm in place, Left in bed, Call light within reach, Nurse notified, Patient at risk for falls    G-Code       OutComes Score                                                  AM-PAC Score     AM-PAC Inpatient Mobility without Stair Climbing Raw Score : 9 (02/28/21 1514)  AM-PAC Inpatient without Stair Climbing T-Scale Score : 32.44 (02/28/21 1514)  Mobility Inpatient CMS 0-100% Score: 76.07 (02/28/21 1514)  Mobility Inpatient without Stair CMS G-Code Modifier : CL (02/28/21 1514)       Goals  Short term goals  Time Frame for Short term goals: one week unless otherwise stated  Short term goal 1: Patient will perform supine <> sit with CGA Short term goal 2: Patient will perform sit <> stand to AD with Min A  Short term goal 3: Patient will ambulate 15 ft with RW and Min A  Short term goal 4: [place] will perform 10-15 reps BLE exercise to increase LE strength and ROM to maximize functional mobility by 3/2  Patient Goals   Patient goals : to go home       Therapy Time   Individual Concurrent Group Co-treatment   Time In 1018         Time Out 1051         Minutes 33         Timed Code Treatment Minutes: [address], [place]

## 2021-02-28 NOTE — PROGRESS NOTES
2/28/21 6:02 PM   [address] or Facility: [facility] From: Suzann Prader RE: Melyssa Paul 02/11/1929 RM: [id number] Patient has just had preston placed urology for urinary retention. Patient is blind and highly agitated and confused. May an order be placed to have restraints placed on patient if needed.  Thank you Need Callback: NO CALLBACK REQ C4 PROGRESSIVE CARE  Read 6:02 PM     2/28/21 6:02 PM   Yes, thanks (Daria Koroma)

## 2021-02-28 NOTE — PROGRESS NOTES
Hospitalist Progress Note      PCP: Chayito Cobb MD    Date of Admission: 2/26/2021    Chief Complaint: Pt c/o left sided chest pain with radiation into the left arm. Hospital Course: 80y.o. year-old male PMH COPD, Hypothyroidism, Glaucoma, Macular degeneration and BPH presents from his nursing home for evaluation following an acute onset of sharp, stabbing pain in his left chest wall that radiates to his left arm. The pain is present constantly and is worse when he takes a deep breath. In the ER he was found to have Pneumonia, associated sepsis and an elevated troponin. His EKG had no ischemic changes. Associated signs and symptoms do not include fever or chills, nausea, vomiting, abdominal pain, hemoptysis, hematochezia, diarrhea, constipation or urinary symptoms. CT CAP shows cirrhosis, left upper lobe pneumonia with an associated left pleural effusion , atrophic kidneys and pericardial effusion (didnt specify how big). WBC 13.4 . ddimer 760. CV19 neg. Was started on levaquin. Troponin 0.03 x3 seen by cardiology doesn't warrant further workup feels the chest pain probably more pleurisy     Subjective: daughter stets he is very confused he is asking how he got here doesn't seem to know where he is. Then they talked about he had chest pain yesterday he says its still there if he coughs    Hallucinating, says he sees a wall of maps. Then stopped and said wow did the lights just come on brighter. He kind of stopped in midst of deep breath due to left chest pain on exam     History of back issue . Doesn't walk much, back pain can get worse if walking and standing he can usually do self transfers and has learned his way to the bathroom. Gets assistance with showers. He has a system developed for how he dresses. Gets OT PT there.  [facility] [place]   All other systems neg       Medications:  Reviewed    Infusion Medications   Scheduled Medications    ketorolac  15 mg Intravenous Once Peripheral Pulses: +2 palpable, equal bilaterally       Labs:   Recent Labs     02/26/21  0301 02/27/21  0529 02/28/21  0511   WBC 13.4* 9.9 10.0   HGB 12.4* 11.5* 11.2*   HCT 36.4* 33.1* 32.0*    184 220     Recent Labs     02/26/21  0301 02/27/21  0529 02/28/21  0511   * 137 134*   K 3.4* 3.7 3.5   CL 97* 103 102   CO2 23 24 23   BUN 33* 26* 19   CREATININE 1.4* 1.2 1.2   CALCIUM 9.4 9.0 9.3     Recent Labs     02/26/21  0301   AST 40*   ALT 33   BILITOT 3.4*   ALKPHOS 76     No results for input(s): INR in the last 72 hours. Recent Labs     02/26/21  0301 02/26/21  0852 02/26/21  1122   TROPONINI 0.03* 0.03* 0.03*       Urinalysis:      Lab Results   Component Value Date    NITRU Negative 04/23/2016    BLOODU Negative 04/23/2016    SPECGRAV 1.015 04/23/2016    GLUCOSEU Negative 04/23/2016       Radiology:  CTA HEAD NECK W CONTRAST   Final Result   1. Bilateral internal carotid artery cavernous segment scattered   atherosclerotic calcification resulting up to approximately bilateral 20%   arterial stenosis. Finding is compatible with 16-49% stenosis per   sonographic NASCET index criteria. 2. Bilateral carotid bifurcation atherosclerotic plaque resulting in   approximately 30% stenosis of the left internal carotid artery origin. Finding is compatible with 16-49% stenosis per sonographic NASCET index   criteria. 3. Incidental finding of severe centrilobular emphysema. RECOMMENDATIONS:   For clinical suspicion of acute ischemia, recommend MRI brain with   diffusion-weighted imaging for further evaluation         CT CHEST ABDOMEN PELVIS W CONTRAST   Final Result   1. Left upper lobe pneumonia. Follow-up radiographs are recommended to   ensure resolution. There is an associated trace left pleural effusion. 2. Cirrhosis. 3. Cholelithiasis. 4. Atrophic kidneys. 5. Atherosclerotic disease. 6. Pericardial effusion.          XR CHEST PORTABLE   Final Result 1. Left mid lung infiltrate, concerning for pneumonia. Follow-up radiographs   are recommended to ensure resolution. 2. Emphysema. Echo:2/20/2019  Overall left ventricular ejection fraction is estimated to be 55-60%. The left ventricular wall motion is normal.  Trace pericardial effusion.     2/26/2021   Summary   Limited for pericardial effusion.   Small circumferential pericardial effusion without tamponade       Assessment/Plan:    Active Hospital Problems    Diagnosis    Chest pain [R07.9]    Acquired hypothyroidism [E03.9]    COPD (chronic obstructive pulmonary disease) ([facility]) [J44.9]    Hypokalemia [E87.6]    Elevated troponin [R77.8]    Sepsis ([facility]) [A41.9]    Tachycardia [R00.0]    Tachypnea [D49.36]    Neutrophilic leukocytosis [K48.4]    Pneumonia [J18.9]    Chronic diastolic congestive heart failure ([facility]) [I50.32]    Pericardial effusion [I31.3]     Pneumonia  --levaquin     Chest pain left sided   Pleuritic chest pain  Pleurisy    --chest pain that is more consistent with pleurisy than cardiac  --Elevated troponins due to supply demand mismatch and does not represent acute plaque rupture per cardiology   --tramadol prn pain   Pain relieved after a dose of toradol IV yesterday     COPD  --on CT centrilobular emphysema . currently on O2 nailbeds dusky   ordered ABG to rule out hypoxia      Acute on chronic renal dz  ---cr 1.3 2018, was 1.4 on admission now 1.2     Hypomagnesemia  --Mag 1.5 give mag sulfate 2Gm IVPB  Add electrolyte replacement protocol     Psychosis  --daughter says took remeron in past for anxiety but doesn't think takes it now  Discussed with the daughters the one thinks we still have to try to treat for infx   He is more agitated and angry today hallucinating constantly calling out to people not thee thinks he needs to go upstairs wanted to  the pencil on the floor.   Will trial seroquel 25mg HS starting tonight 2/28

## 2021-03-01 ENCOUNTER — APPOINTMENT (OUTPATIENT)
Dept: CT IMAGING | Age: 86
DRG: 871 | End: 2021-03-01
Payer: MEDICARE

## 2021-03-01 LAB
ANION GAP SERPL CALCULATED.3IONS-SCNC: 15 MMOL/L (ref 3–16)
ANISOCYTOSIS: ABNORMAL
ATYPICAL LYMPHOCYTE RELATIVE PERCENT: 4 % (ref 0–6)
BANDED NEUTROPHILS RELATIVE PERCENT: 17 % (ref 0–7)
BASOPHILS ABSOLUTE: 0 K/UL (ref 0–0.2)
BASOPHILS RELATIVE PERCENT: 0 %
BUN BLDV-MCNC: 19 MG/DL (ref 7–20)
CALCIUM SERPL-MCNC: 9.5 MG/DL (ref 8.3–10.6)
CHLORIDE BLD-SCNC: 102 MMOL/L (ref 99–110)
CO2: 18 MMOL/L (ref 21–32)
CREAT SERPL-MCNC: 1.2 MG/DL (ref 0.8–1.3)
EOSINOPHILS ABSOLUTE: 0 K/UL (ref 0–0.6)
EOSINOPHILS RELATIVE PERCENT: 0 %
GFR AFRICAN AMERICAN: >60
GFR NON-AFRICAN AMERICAN: 57
GLUCOSE BLD-MCNC: 81 MG/DL (ref 70–99)
HCT VFR BLD CALC: 33.8 % (ref 40.5–52.5)
HEMOGLOBIN: 11.5 G/DL (ref 13.5–17.5)
L. PNEUMOPHILA SEROGP 1 UR AG: NORMAL
LYMPHOCYTES ABSOLUTE: 0.6 K/UL (ref 1–5.1)
LYMPHOCYTES RELATIVE PERCENT: 1 %
MACROCYTES: ABNORMAL
MAGNESIUM: 2 MG/DL (ref 1.8–2.4)
MCH RBC QN AUTO: 36.2 PG (ref 26–34)
MCHC RBC AUTO-ENTMCNC: 33.9 G/DL (ref 31–36)
MCV RBC AUTO: 106.6 FL (ref 80–100)
METAMYELOCYTES RELATIVE PERCENT: 1 %
MONOCYTES ABSOLUTE: 0.8 K/UL (ref 0–1.3)
MONOCYTES RELATIVE PERCENT: 6 %
NEUTROPHILS ABSOLUTE: 11.5 K/UL (ref 1.7–7.7)
NEUTROPHILS RELATIVE PERCENT: 71 %
PDW BLD-RTO: 13.7 % (ref 12.4–15.4)
PLATELET # BLD: 235 K/UL (ref 135–450)
PMV BLD AUTO: 7.7 FL (ref 5–10.5)
POIKILOCYTES: ABNORMAL
POTASSIUM REFLEX MAGNESIUM: 3.2 MMOL/L (ref 3.5–5.1)
RBC # BLD: 3.17 M/UL (ref 4.2–5.9)
SODIUM BLD-SCNC: 135 MMOL/L (ref 136–145)
STREP PNEUMONIAE ANTIGEN, URINE: NORMAL
URINE CULTURE, ROUTINE: NORMAL
WBC # BLD: 12.9 K/UL (ref 4–11)

## 2021-03-01 PROCEDURE — 6360000002 HC RX W HCPCS: Performed by: INTERNAL MEDICINE

## 2021-03-01 PROCEDURE — 2700000000 HC OXYGEN THERAPY PER DAY

## 2021-03-01 PROCEDURE — 85025 COMPLETE CBC W/AUTO DIFF WBC: CPT

## 2021-03-01 PROCEDURE — 2580000003 HC RX 258: Performed by: INTERNAL MEDICINE

## 2021-03-01 PROCEDURE — 94640 AIRWAY INHALATION TREATMENT: CPT

## 2021-03-01 PROCEDURE — 1200000000 HC SEMI PRIVATE

## 2021-03-01 PROCEDURE — 6360000002 HC RX W HCPCS: Performed by: HOSPITALIST

## 2021-03-01 PROCEDURE — 36415 COLL VENOUS BLD VENIPUNCTURE: CPT

## 2021-03-01 PROCEDURE — 83735 ASSAY OF MAGNESIUM: CPT

## 2021-03-01 PROCEDURE — 80048 BASIC METABOLIC PNL TOTAL CA: CPT

## 2021-03-01 PROCEDURE — 70450 CT HEAD/BRAIN W/O DYE: CPT

## 2021-03-01 PROCEDURE — 6360000002 HC RX W HCPCS: Performed by: NURSE PRACTITIONER

## 2021-03-01 PROCEDURE — 99223 1ST HOSP IP/OBS HIGH 75: CPT | Performed by: PSYCHIATRY & NEUROLOGY

## 2021-03-01 PROCEDURE — 94761 N-INVAS EAR/PLS OXIMETRY MLT: CPT

## 2021-03-01 RX ORDER — LORAZEPAM 2 MG/ML
3 INJECTION INTRAMUSCULAR
Status: DISCONTINUED | OUTPATIENT
Start: 2021-03-01 | End: 2021-03-03

## 2021-03-01 RX ORDER — LORAZEPAM 1 MG/1
2 TABLET ORAL
Status: DISCONTINUED | OUTPATIENT
Start: 2021-03-01 | End: 2021-03-03

## 2021-03-01 RX ORDER — POTASSIUM CHLORIDE 7.45 MG/ML
10 INJECTION INTRAVENOUS
Status: COMPLETED | OUTPATIENT
Start: 2021-03-01 | End: 2021-03-01

## 2021-03-01 RX ORDER — LORAZEPAM 2 MG/ML
2 INJECTION INTRAMUSCULAR
Status: DISCONTINUED | OUTPATIENT
Start: 2021-03-01 | End: 2021-03-03

## 2021-03-01 RX ORDER — SODIUM CHLORIDE 0.9 % (FLUSH) 0.9 %
10 SYRINGE (ML) INJECTION PRN
Status: DISCONTINUED | OUTPATIENT
Start: 2021-03-01 | End: 2021-03-03 | Stop reason: HOSPADM

## 2021-03-01 RX ORDER — QUETIAPINE FUMARATE 25 MG/1
25 TABLET, FILM COATED ORAL EVERY 6 HOURS PRN
Status: DISCONTINUED | OUTPATIENT
Start: 2021-03-01 | End: 2021-03-03

## 2021-03-01 RX ORDER — LORAZEPAM 1 MG/1
4 TABLET ORAL
Status: DISCONTINUED | OUTPATIENT
Start: 2021-03-01 | End: 2021-03-03

## 2021-03-01 RX ORDER — SODIUM CHLORIDE 9 MG/ML
INJECTION, SOLUTION INTRAVENOUS CONTINUOUS
Status: DISCONTINUED | OUTPATIENT
Start: 2021-03-01 | End: 2021-03-02

## 2021-03-01 RX ORDER — SODIUM CHLORIDE 0.9 % (FLUSH) 0.9 %
10 SYRINGE (ML) INJECTION EVERY 12 HOURS SCHEDULED
Status: DISCONTINUED | OUTPATIENT
Start: 2021-03-01 | End: 2021-03-03

## 2021-03-01 RX ORDER — LORAZEPAM 2 MG/ML
1 INJECTION INTRAMUSCULAR
Status: DISCONTINUED | OUTPATIENT
Start: 2021-03-01 | End: 2021-03-03

## 2021-03-01 RX ORDER — QUETIAPINE FUMARATE 25 MG/1
50 TABLET, FILM COATED ORAL NIGHTLY
Status: DISCONTINUED | OUTPATIENT
Start: 2021-03-01 | End: 2021-03-03

## 2021-03-01 RX ORDER — LORAZEPAM 1 MG/1
1 TABLET ORAL
Status: DISCONTINUED | OUTPATIENT
Start: 2021-03-01 | End: 2021-03-03

## 2021-03-01 RX ORDER — ZIPRASIDONE MESYLATE 20 MG/ML
10 INJECTION, POWDER, LYOPHILIZED, FOR SOLUTION INTRAMUSCULAR ONCE
Status: COMPLETED | OUTPATIENT
Start: 2021-03-01 | End: 2021-03-01

## 2021-03-01 RX ORDER — LORAZEPAM 1 MG/1
3 TABLET ORAL
Status: DISCONTINUED | OUTPATIENT
Start: 2021-03-01 | End: 2021-03-03

## 2021-03-01 RX ORDER — LORAZEPAM 2 MG/ML
4 INJECTION INTRAMUSCULAR
Status: DISCONTINUED | OUTPATIENT
Start: 2021-03-01 | End: 2021-03-03

## 2021-03-01 RX ADMIN — ZIPRASIDONE MESYLATE 10 MG: 20 INJECTION, POWDER, LYOPHILIZED, FOR SOLUTION INTRAMUSCULAR at 01:28

## 2021-03-01 RX ADMIN — LORAZEPAM 3 MG: 2 INJECTION, SOLUTION INTRAMUSCULAR; INTRAVENOUS at 11:26

## 2021-03-01 RX ADMIN — ENOXAPARIN SODIUM 40 MG: 40 INJECTION SUBCUTANEOUS at 11:58

## 2021-03-01 RX ADMIN — POTASSIUM CHLORIDE 10 MEQ: 7.46 INJECTION, SOLUTION INTRAVENOUS at 17:38

## 2021-03-01 RX ADMIN — TIOTROPIUM BROMIDE INHALATION SPRAY 2 PUFF: 3.12 SPRAY, METERED RESPIRATORY (INHALATION) at 07:40

## 2021-03-01 RX ADMIN — POTASSIUM CHLORIDE 10 MEQ: 7.46 INJECTION, SOLUTION INTRAVENOUS at 15:10

## 2021-03-01 RX ADMIN — POTASSIUM CHLORIDE 10 MEQ: 7.46 INJECTION, SOLUTION INTRAVENOUS at 16:27

## 2021-03-01 RX ADMIN — Medication 2 PUFF: at 07:40

## 2021-03-01 RX ADMIN — SODIUM CHLORIDE: 9 INJECTION, SOLUTION INTRAVENOUS at 11:27

## 2021-03-01 RX ADMIN — LEVOFLOXACIN 500 MG: 5 INJECTION, SOLUTION INTRAVENOUS at 13:32

## 2021-03-01 RX ADMIN — SODIUM CHLORIDE, PRESERVATIVE FREE 10 ML: 5 INJECTION INTRAVENOUS at 20:21

## 2021-03-01 RX ADMIN — POTASSIUM CHLORIDE 10 MEQ: 7.46 INJECTION, SOLUTION INTRAVENOUS at 18:40

## 2021-03-01 RX ADMIN — Medication 10 ML: at 20:16

## 2021-03-01 RX ADMIN — HALOPERIDOL LACTATE 2 MG: 5 INJECTION, SOLUTION INTRAMUSCULAR at 05:35

## 2021-03-01 ASSESSMENT — PAIN SCALES - WONG BAKER: WONGBAKER_NUMERICALRESPONSE: 4

## 2021-03-01 NOTE — PROGRESS NOTES
 tiotropium  2 puff Inhalation Daily    sodium chloride flush  10 mL Intravenous 2 times per day    enoxaparin  40 mg Subcutaneous Daily    levofloxacin  500 mg Intravenous Q24H     PRN Meds: sodium chloride flush, LORazepam **OR** LORazepam **OR** LORazepam **OR** LORazepam **OR** LORazepam **OR** LORazepam **OR** LORazepam **OR** LORazepam, potassium chloride **OR** potassium alternative oral replacement **OR** potassium chloride, haloperidol lactate, sodium chloride flush, promethazine **OR** ondansetron, polyethylene glycol, acetaminophen **OR** acetaminophen, perflutren lipid microspheres, traMADol      Intake/Output Summary (Last 24 hours) at 3/1/2021 1037  Last data filed at 3/1/2021 0503  Gross per 24 hour   Intake 50 ml   Output 350 ml   Net -300 ml       Physical Exam Performed:    /73   Pulse 105   Temp 97.4 °F (36.3 °C) (Axillary)   Resp 20   Ht 5' 11\" (1.803 m)   Wt 120 lb 2.4 oz (54.5 kg)   SpO2 92%   BMI 16.76 kg/m²     General appearance: elderly  male, confused and agitated  HEENT: Pupils equal, round,  Bilaterally blind ( chronic), [place] - uses hearing aids but does not have them on  Neck: Supple, range of motion. No jugular venous distention. Trachea midline. Respiratory:  Normal effort. Bibasilar rales with no overt wheezes  Cardiovascular: Regular rate and rhythm with normal S1/S2 without murmurs, rubs or gallops. Abdomen: Soft, non-tender, non-distended with normal bowel sounds. Musculoskeletal: No clubbing, cyanosis or edema bilaterally. Skin:warm and dry   Neurologic:  Limited due to agitation and inabilty to follow commands, blind and [place].  Moving extremities 'Psychiatric: confused and agitated, AAOX0          Labs:   Recent Labs     02/27/21  0529 02/28/21  0511 03/01/21  0814   WBC 9.9 10.0 12.9*   HGB 11.5* 11.2* 11.5*   HCT 33.1* 32.0* 33.8*    220 235     Recent Labs     02/27/21  0529 02/28/21  0511 03/01/21  0813    134* 135*   K 3.7 3.5 3.2*  102 102   CO2 24 23 18*   BUN 26* 19 19   CREATININE 1.2 1.2 1.2   CALCIUM 9.0 9.3 9.5     No results for input(s): AST, ALT, BILIDIR, BILITOT, ALKPHOS in the last 72 hours. No results for input(s): INR in the last 72 hours. Recent Labs     02/26/21  1122   TROPONINI 0.03*       Urinalysis:      Lab Results   Component Value Date    NITRU Negative 02/28/2021    WBCUA 3-5 02/28/2021    BACTERIA 2+ 02/28/2021    RBCUA 11-20 02/28/2021    BLOODU MODERATE 02/28/2021    SPECGRAV 1.025 02/28/2021    GLUCOSEU Negative 02/28/2021       Radiology:  CTA HEAD NECK W CONTRAST   Final Result   1. Bilateral internal carotid artery cavernous segment scattered   atherosclerotic calcification resulting up to approximately bilateral 20%   arterial stenosis. Finding is compatible with 16-49% stenosis per   sonographic NASCET index criteria. 2. Bilateral carotid bifurcation atherosclerotic plaque resulting in   approximately 30% stenosis of the left internal carotid artery origin. Finding is compatible with 16-49% stenosis per sonographic NASCET index   criteria. 3. Incidental finding of severe centrilobular emphysema. RECOMMENDATIONS:   For clinical suspicion of acute ischemia, recommend MRI brain with   diffusion-weighted imaging for further evaluation         CT CHEST ABDOMEN PELVIS W CONTRAST   Final Result   1. Left upper lobe pneumonia. Follow-up radiographs are recommended to   ensure resolution. There is an associated trace left pleural effusion. 2. Cirrhosis. 3. Cholelithiasis. 4. Atrophic kidneys. 5. Atherosclerotic disease. 6. Pericardial effusion. XR CHEST PORTABLE   Final Result   1. Left mid lung infiltrate, concerning for pneumonia. Follow-up radiographs   are recommended to ensure resolution. 2. Emphysema. CT HEAD WO CONTRAST    (Results Pending)     Echo:2/20/2019  Overall left ventricular ejection fraction is estimated to be 55-60%. The left ventricular wall motion is normal.  Trace pericardial effusion.     2/26/2021   Summary   Limited for pericardial effusion.   Small circumferential pericardial effusion without tamponade       Assessment/Plan:    Active Hospital Problems    Diagnosis    Chest pain [R07.9]    Acquired hypothyroidism [E03.9]    COPD (chronic obstructive pulmonary disease) ([facility]) [J44.9]    Hypokalemia [E87.6]    Elevated troponin [R77.8]    Sepsis ([facility] [address] [A41.9]    Tachycardia [R00.0]    Tachypnea [A70.89]    Neutrophilic leukocytosis [I99.0]    Pneumonia [J18.9]    Chronic diastolic congestive heart failure ([facility]) [I50.32]    Pericardial effusion [I31.3]     Pneumonia  --continue levaquin     Pleuritic Chest pain: likely due to pleurisy from pneumonia. Cardiac etiology r/o per cardio. Pain control PRN, abx for pneumonia          Elevated troponin: likely due to supply demand mismatch and does not represent acute plaque rupture per cardiology. EKG was non acute, No further ischemic eval needed at this time. COPD: not in acute exacerbation. No wheezing on exam.  CT centrilobular emphysema noted on CT. ABG reviewed. 02 weaned off at time of eval.  Continue 02 PRN, inhalers. Acute on chronic renal dz  ---resolved. Hypomagnesemia: repleted. Resolved       Acute metab encephalopathy: likely multifactorial from pneumonia, suspected underlying dementia and ? alcohol withdrawal given chronic alcholol use though family reports no hx of withdrawal in past. Check CT head. Supportive care for acute morbidities           Psychosis: minimally improved despite remeron, geodon, trial of seroquel. Will consult psych for assistance. Continue current meds. Alcohol abuse : start  CIWA protocol       Acute urinary retention: preston placed by urology. DVT Prophylaxis: lovenox 40mg daily   Diet: DIET GENERAL;  Code Status: Full Code, confirmed with daughter Ana Cristina Gonzalez who states she is his POA. PT/OT Eval Status: rec SNF       Dispo - hard to say, pending clinical course     Christiano Brown MD

## 2021-03-01 NOTE — CONSULTS
Full note dictated. Met with patient (who was asleep after getting Ativan) and his daughter, met with NEREIDA Cunningham and reviewed chart. Ass:  Delirium    Rec:  1). I will simplify options but I think the Seroquel is a good choice. Discussed with the daughter the black box warning for geriatric patients but she agreed the benefits outweigh the risks currently. I will dc the geodon/haldol and increase HS dose of Seroquel to 50 mgs and add a prn of 25 mgs during the day if needed. 2).  I clinically doubt that alcohol withdrawal is part of the picture because we are at [address] [facility] and he gives up alcohol for lent and typically doesn't suffer at that time, but he is physically ill so I will leave the ativan order for now. 3).  He was agitated last night and family was trying to keep him in the bed. Safer to use soft restraints and I discussed this with the daughter and she agreed. 4)  It is not clear that he was taking remeron and that can worsen confusion so will D/C             5).  I will continue to round. Please call me if any questions arise.      Natividad Xavier MD

## 2021-03-01 NOTE — PROGRESS NOTES
Urology Attending Progress Note      Subjective: Patient not alert and oriented    80 y.o.male seeing for AUR. Patient was unable to void, RN unable to place preston, Dr. Beau Mccall placed 16fr coude and secured to prevent pulling out. Vitals:  /73   Pulse 105   Temp 97.4 °F (36.3 °C) (Axillary)   Resp 20   Ht 5' 11\" (1.803 m)   Wt 120 lb 2.4 oz (54.5 kg)   SpO2 92%   BMI 16.76 kg/m²   Temp  Av.6 °F (36.4 °C)  Min: 97.4 °F (36.3 °C)  Max: 98 °F (36.7 °C)    Intake/Output Summary (Last 24 hours) at 3/1/2021 0922  Last data filed at 3/1/2021 0503  Gross per 24 hour   Intake 50 ml   Output 350 ml   Net -300 ml       Exam:   ? Well developed  ? Patient is not orientated to time and place  ? Neck is supple, trachea is midline  ? Cardiovascular show no extremity swelling  ? Skin warm and dry   ? Clear, chelsey urine in preston catheter    Labs:  WBC:    Lab Results   Component Value Date    WBC 12.9 2021     Hemoglobin/Hematocrit:    Lab Results   Component Value Date    HGB 11.5 2021    HCT 33.8 2021     BMP:    Lab Results   Component Value Date     2021    K 3.2 2021     2021    CO2 18 2021    BUN 19 2021    LABALBU 3.1 2021    CREATININE 1.2 2021    CALCIUM 9.5 2021    GFRAA >60 2021    LABGLOM 57 2021     PT/INR:  No results found for: PROTIME, INR  PTT:  No results found for: APTT[APTT    Impression/Plan:   89 y.o.male  With AUR, Rn unable to place preston, Dr. Beau Mccall placed preston catheter.  Preston catheter today shows clear, chelsey urine.   -patient can be discharged with preston catheter and can see us outpatient for voiding trial, I discussed this with family member--want patient to get stronger prior to VT   -signing off [facility] standpoint, call with questions    Dorothe Space, PA-C

## 2021-03-01 NOTE — FLOWSHEET NOTE
Follow up visit. Pt was asleep at the time, son was present at bedside.  spoke with pt's son and will attempt another visit at a later time.      03/01/21 1605   Encounter Summary   Services provided to: Family   Referral/Consult From: Other    Support System Children;Family members   Continue Visiting Yes  (3/1 pt asleep)   Complexity of Encounter Low   Length of Encounter 15 minutes   Spiritual/Anglican   Type Spiritual support

## 2021-03-01 NOTE — FLOWSHEET NOTE
03/01/21 0815   Assessment   Charting Type Shift assessment   Neurological   Neuro (WDL) X   Level of Consciousness Alert (0)   Orientation Level Oriented to person;Oriented to time;Disoriented to place; Disoriented to situation   Cognition Poor safety awareness;Poor attention/concentration; Short term memory loss; Impulsive;Poor judgement   Language Clear   [place] Coma Scale   Eye Opening 4   Best Verbal Response 4   Best Motor Response 6   [facility] Coma Scale Score 14   HEENT   HEENT (WDL) X   Right Eye Blind   Left Eye Blind   Right Ear Impaired hearing   Left Ear Impaired hearing   Respiratory   Respiratory (WDL) WDL   Respiratory Pattern Regular   Respiratory Depth Normal   Respiratory Quality/Effort Unlabored   Chest Assessment Chest expansion symmetrical;Trachea midline   L Breath Sounds Clear   R Breath Sounds Clear   Cardiac   Cardiac (WDL) WDL   Rhythm Interpretation   Pulse 105   Cardiac Monitor   Telemetry Monitor On Yes   Telemetry Audible Yes   Telemetry Alarms Set Yes   Gastrointestinal   Abdominal (WDL) WDL   Peripheral Vascular   Peripheral Vascular (WDL) WDL   Edema None   RLE Edema None   LLE Edema None   Skin Color/Condition   Skin Color/Condition (WDL) X   Skin Color Ecchymosis; Pale   Skin Condition/Temp Warm;Dry;Flaky   Skin Integrity   Skin Integrity (WDL) WDL   Musculoskeletal   Musculoskeletal (WDL) X   RL Extremity Full movement;Weakness; Unsteady   LL Extremity Full movement;Weakness; Unsteady   Genitourinary   Genitourinary (WDL) WDL   Flank Tenderness No   Suprapubic Tenderness No   Dysuria No   Anus/Rectum   Anus/Rectum (WDL) WDL   Urethral Catheter Coude   Placement Date/Time: 02/28/21 1300   Catheter Type: Coude  Securement Method: Tape   Catheter Indications Urology/Urologist seeing this patient or inserted indwelling catheter   Psychosocial   Psychosocial (WDL) WDL

## 2021-03-01 NOTE — PLAN OF CARE
Problem: Falls - Risk of:  Goal: Will remain free from falls  Description: Will remain free from falls  Outcome: Ongoing  Note: Pt high fall risk. Instructed to use call light before getting out of bed. Call light within reach. Bed in low position. Bed alarm on. Will continue to monitor. Goal: Absence of physical injury  Description: Absence of physical injury  Outcome: Ongoing     Problem: Skin Integrity:  Goal: Will show no infection signs and symptoms  Description: Will show no infection signs and symptoms  Outcome: Ongoing  Note: Patient skin kept clean and dry, cleansed if soiled. Patient turned every 2 hours if not able to turn self. No evidence of skin breakdown this shift, will continue to monitor.     Goal: Absence of new skin breakdown  Description: Absence of new skin breakdown  Outcome: Ongoing     Problem: VIOLENT RESTRAINTS  Goal: Removal from restraints as soon as assessed to be safe  Outcome: Ongoing  Goal: No harm/injury to patient while restraints in use  Outcome: Ongoing  Goal: Patient's dignity will be maintained  Outcome: Ongoing

## 2021-03-02 LAB
ANION GAP SERPL CALCULATED.3IONS-SCNC: 9 MMOL/L (ref 3–16)
BASOPHILS ABSOLUTE: 0 K/UL (ref 0–0.2)
BASOPHILS RELATIVE PERCENT: 0.4 %
BLOOD CULTURE, ROUTINE: NORMAL
BUN BLDV-MCNC: 20 MG/DL (ref 7–20)
CALCIUM SERPL-MCNC: 9.3 MG/DL (ref 8.3–10.6)
CHLORIDE BLD-SCNC: 105 MMOL/L (ref 99–110)
CO2: 21 MMOL/L (ref 21–32)
CREAT SERPL-MCNC: 1 MG/DL (ref 0.8–1.3)
CULTURE, BLOOD 2: NORMAL
EOSINOPHILS ABSOLUTE: 0.2 K/UL (ref 0–0.6)
EOSINOPHILS RELATIVE PERCENT: 1.5 %
GFR AFRICAN AMERICAN: >60
GFR NON-AFRICAN AMERICAN: >60
GLUCOSE BLD-MCNC: 63 MG/DL (ref 70–99)
HCT VFR BLD CALC: 31.5 % (ref 40.5–52.5)
HEMOGLOBIN: 10.9 G/DL (ref 13.5–17.5)
LYMPHOCYTES ABSOLUTE: 0.9 K/UL (ref 1–5.1)
LYMPHOCYTES RELATIVE PERCENT: 9.1 %
MCH RBC QN AUTO: 36.8 PG (ref 26–34)
MCHC RBC AUTO-ENTMCNC: 34.7 G/DL (ref 31–36)
MCV RBC AUTO: 106 FL (ref 80–100)
MONOCYTES ABSOLUTE: 0.8 K/UL (ref 0–1.3)
MONOCYTES RELATIVE PERCENT: 8 %
NEUTROPHILS ABSOLUTE: 8.2 K/UL (ref 1.7–7.7)
NEUTROPHILS RELATIVE PERCENT: 81 %
PDW BLD-RTO: 14.2 % (ref 12.4–15.4)
PLATELET # BLD: 267 K/UL (ref 135–450)
PMV BLD AUTO: 7.5 FL (ref 5–10.5)
POTASSIUM REFLEX MAGNESIUM: 3.7 MMOL/L (ref 3.5–5.1)
RBC # BLD: 2.97 M/UL (ref 4.2–5.9)
SODIUM BLD-SCNC: 135 MMOL/L (ref 136–145)
WBC # BLD: 10.1 K/UL (ref 4–11)

## 2021-03-02 PROCEDURE — 6360000002 HC RX W HCPCS: Performed by: INTERNAL MEDICINE

## 2021-03-02 PROCEDURE — 85025 COMPLETE CBC W/AUTO DIFF WBC: CPT

## 2021-03-02 PROCEDURE — 1200000000 HC SEMI PRIVATE

## 2021-03-02 PROCEDURE — 2580000003 HC RX 258: Performed by: INTERNAL MEDICINE

## 2021-03-02 PROCEDURE — 80048 BASIC METABOLIC PNL TOTAL CA: CPT

## 2021-03-02 PROCEDURE — 6370000000 HC RX 637 (ALT 250 FOR IP): Performed by: INTERNAL MEDICINE

## 2021-03-02 RX ORDER — BUDESONIDE AND FORMOTEROL FUMARATE DIHYDRATE 160; 4.5 UG/1; UG/1
2 AEROSOL RESPIRATORY (INHALATION) PRN
Status: DISCONTINUED | OUTPATIENT
Start: 2021-03-02 | End: 2021-03-03 | Stop reason: HOSPADM

## 2021-03-02 RX ORDER — DEXTROSE AND SODIUM CHLORIDE 5; .9 G/100ML; G/100ML
INJECTION, SOLUTION INTRAVENOUS CONTINUOUS
Status: DISCONTINUED | OUTPATIENT
Start: 2021-03-02 | End: 2021-03-03 | Stop reason: HOSPADM

## 2021-03-02 RX ADMIN — SODIUM CHLORIDE: 9 INJECTION, SOLUTION INTRAVENOUS at 04:03

## 2021-03-02 RX ADMIN — SENNOSIDES 8.6 MG: 8.6 TABLET, FILM COATED ORAL at 09:06

## 2021-03-02 RX ADMIN — FOLIC ACID 1 MG: 1 TABLET ORAL at 09:06

## 2021-03-02 RX ADMIN — DEXTROSE AND SODIUM CHLORIDE: 5; 900 INJECTION, SOLUTION INTRAVENOUS at 09:06

## 2021-03-02 RX ADMIN — Medication 10 ML: at 20:55

## 2021-03-02 RX ADMIN — Medication 10 ML: at 09:06

## 2021-03-02 RX ADMIN — DEXTROSE AND SODIUM CHLORIDE: 5; 900 INJECTION, SOLUTION INTRAVENOUS at 20:51

## 2021-03-02 RX ADMIN — LEVOFLOXACIN 500 MG: 5 INJECTION, SOLUTION INTRAVENOUS at 13:06

## 2021-03-02 RX ADMIN — SODIUM CHLORIDE, PRESERVATIVE FREE 10 ML: 5 INJECTION INTRAVENOUS at 20:56

## 2021-03-02 RX ADMIN — ENOXAPARIN SODIUM 40 MG: 40 INJECTION SUBCUTANEOUS at 09:06

## 2021-03-02 RX ADMIN — FINASTERIDE 5 MG: 5 TABLET, FILM COATED ORAL at 09:06

## 2021-03-02 RX ADMIN — LORAZEPAM 4 MG: 2 INJECTION, SOLUTION INTRAMUSCULAR; INTRAVENOUS at 09:21

## 2021-03-02 RX ADMIN — SODIUM CHLORIDE, PRESERVATIVE FREE 10 ML: 5 INJECTION INTRAVENOUS at 09:07

## 2021-03-02 NOTE — FLOWSHEET NOTE
Pt's daughter was at bedside, she shared pt's health situation with , asked  to pray for pt. Prayer was said at bedside for pt.     03/02/21 1040   Encounter Summary   Services provided to: Family   Referral/Consult From: Mk Jimenez   (3/2 prayer at bedside)   Complexity of Encounter Low   Length of Encounter 15 minutes   Grief and Life Adjustment   Type Adjustment to illness   Assessment Calm; Approachable;Fearful   Intervention Prayer   Outcome Expressed gratitude

## 2021-03-02 NOTE — PROGRESS NOTES
Physical & Occupational Therapy  Attempted to see pt for follow-up, but per discussion with nurse, pt not following simple commands at this time and upset overall. Will re-attempt next date as schedule permits and pt is appropriate.      Chapin Franco PTA / Jj Weldon OTR/L

## 2021-03-02 NOTE — CONSULTS
FAMILY HISTORY:  Negative for completed suicide. SOCIAL HISTORY:  The patient was living in assisted living after having  been with his daughter for 10 months preceding that. She says that her  father's care requirements became more than she and her family could  reasonably manage. But because of the pandemic, he has been more  isolated and that has created quite a lot of anxieties for the patient. Because he is blind, it has been very hard for him to get used to be in  a new facility without his family being able to be more involved. There  are no issues with trauma and no specific financial pressures that the  family disclose. REVIEW OF SYSTEMS:  A 10-system review could not be completed because  the patient was sedated and that will be done at a later time. LABORATORY DATA:  His laboratory results were reviewed. PHYSICAL EXAMINATION:  His physical exam was reviewed with the family. MENTAL STATUS EXAMINATION:  The patient appears as a male who looks his  stated age, but he is lying in bed asleep and a formal mental status  exam could not be performed. He was clearly hallucinating early in the  day and the daughter described the same kind of scenarios that the nurse  had described. Could not assess his level of orientation etc., or his  memory again because of his current level of sedation. DIAGNOSES:  AXIS I:  Delirium. AXIS II:  No diagnosis. AXIS III:  1. Pneumonia. 2.  Rule out UTI. 3.  Rule out sepsis. AXIS IV:  Severe. AXIS V:  Current GAF 25. Highest in the past year 39 to 48. RECOMMENDATIONS:  1.  I think at this point treating the active hallucinatory component to  his delirium will probably help reduce his agitation levels. I am going  to simplify the choices so that he does not get Geodon and Haldol and  Seroquel which I am afraid might worsen things.   So I will discontinue  the Haldol and the Geodon (which I think was only given as a one time shot) and increase the Seroquel to 50 mg at bedtime. A 25-mg dose last  night was apparently ineffective, although the family and I talked about  the black box warning that is in place for all of the antipsychotics in  the geriatric population and after that discussion she agreed that the  benefits outweigh the risks at least short term. I will also add in  Seroquel 25 mg every 6 hours p.r.n. during the day so that the staff  have something to use. In terms of the Ativan, I am like, it is a good  idea to at least consider the possibility of alcohol withdrawal at least  by history. I doubt that is clinically relevant, but I will leave the  Ativan on in case the Seroquel is ineffective at the doses ordered. 2.  I will continue to round on the patient daily. If any questions arise about my findings, please feel free to call me.         Kinza Hancock MD    D: 03/01/2021 14:43:13       T: 03/01/2021 16:03:49     [id number]  Job#: [id number]     Doc#: [id number]    CC:

## 2021-03-02 NOTE — PROGRESS NOTES
Hospitalist Progress Note      PCP: Bijan Juárez MD    Date of Admission: 2/26/2021    Chief Complaint: Pt c/o left sided chest pain with radiation into the left arm. Hospital Course: 80y.o. year-old male PMH COPD, Hypothyroidism, Glaucoma, Macular degeneration and BPH presents from his nursing home for evaluation following an acute onset of sharp, stabbing pain in his left chest wall that radiates to his left arm. The pain is present constantly and is worse when he takes a deep breath. In the ER he was found to have Pneumonia, associated sepsis and an elevated troponin. His EKG had no ischemic changes. Associated signs and symptoms do not include fever or chills, nausea, vomiting, abdominal pain, hemoptysis, hematochezia, diarrhea, constipation or urinary symptoms. CT CAP shows cirrhosis, left upper lobe pneumonia with an associated left pleural effusion , atrophic kidneys and pericardial effusion (didnt specify how big). WBC 13.4 . ddimer 760. CV19 neg. Was started on levaquin. Troponin 0.03 x3 seen by cardiology doesn't warrant further workup feels the chest pain probably more pleurisy     Subjective:       RN noted he had been aspirating on his meds this am. Pt was agitated and got IV ativan per CIWA protocol and currently somnolent at time of eval.  Daughter at bedside.              Medications:  Reviewed    Infusion Medications    dextrose 5 % and 0.9 % NaCl 100 mL/hr at 03/02/21 0906     Scheduled Medications    sodium chloride flush  10 mL Intravenous 2 times per day    QUEtiapine  50 mg Oral Nightly    ketorolac  15 mg Intravenous Once    sodium chloride  1,000 mL Intravenous Once    fentanNYL  25 mcg Intravenous Once    finasteride  5 mg Oral Daily    budesonide-formoterol  2 puff Inhalation BID    folic acid  1 mg Oral Daily    levothyroxine  75 mcg Oral Daily    senna  1 tablet Oral BID    tamsulosin  0.4 mg Oral Nightly    tiotropium  2 puff Inhalation Daily Hypoglycemia:  Likely due to poor p.o. intake due to his mentation. Dextrose added to IV fluid. Monitor      Hyponatremia: Likely due to volume depletion. On IV fluids. Hx of Alcohol use : On  CIWA protocol       Acute urinary retention: preston placed by urology. Dysphagia: chronic and intermittent per daughter. Had needed esophageal dilation in the past.  Placed n.p.o. for now.   Swallow eval    DVT Prophylaxis: lovenox       Diet: Placed n.p.o. for now pending swallow eval    Code Status: Full Code       PT/OT Eval Status: rec SNF       Dispo - hard to say, pending clinical course     Gary Potts MD

## 2021-03-02 NOTE — PROGRESS NOTES
Speech Language Pathology    SLP acknowledged order for BSE, reviewed pt's chart, spoke with RN. RN reported pt was coughing with PO intake earlier this date and has been agitated. RN stated pt was given Ativan and is now sleeping. Will hold BSE at this time and re-attempt as pt is able and appropriate. RN aware.     Macho Mitchell M.S. [id number] [facility]  Speech-language pathologist  [phone]

## 2021-03-03 VITALS
TEMPERATURE: 98.2 F | SYSTOLIC BLOOD PRESSURE: 121 MMHG | OXYGEN SATURATION: 95 % | DIASTOLIC BLOOD PRESSURE: 62 MMHG | WEIGHT: 126.32 LBS | BODY MASS INDEX: 17.69 KG/M2 | HEIGHT: 71 IN | RESPIRATION RATE: 20 BRPM | HEART RATE: 90 BPM

## 2021-03-03 PROBLEM — G93.41 ACUTE METABOLIC ENCEPHALOPATHY: Status: ACTIVE | Noted: 2021-03-03

## 2021-03-03 PROBLEM — R41.0 DELIRIUM: Status: ACTIVE | Noted: 2021-03-03

## 2021-03-03 LAB
ANION GAP SERPL CALCULATED.3IONS-SCNC: 8 MMOL/L (ref 3–16)
BASOPHILS ABSOLUTE: 0.1 K/UL (ref 0–0.2)
BASOPHILS RELATIVE PERCENT: 0.9 %
BUN BLDV-MCNC: 17 MG/DL (ref 7–20)
CALCIUM SERPL-MCNC: 8.9 MG/DL (ref 8.3–10.6)
CHLORIDE BLD-SCNC: 114 MMOL/L (ref 99–110)
CO2: 21 MMOL/L (ref 21–32)
CREAT SERPL-MCNC: 0.9 MG/DL (ref 0.8–1.3)
EOSINOPHILS ABSOLUTE: 0.1 K/UL (ref 0–0.6)
EOSINOPHILS RELATIVE PERCENT: 1.9 %
GFR AFRICAN AMERICAN: >60
GFR NON-AFRICAN AMERICAN: >60
GLUCOSE BLD-MCNC: 127 MG/DL (ref 70–99)
HCT VFR BLD CALC: 30.1 % (ref 40.5–52.5)
HEMOGLOBIN: 10.4 G/DL (ref 13.5–17.5)
LYMPHOCYTES ABSOLUTE: 1 K/UL (ref 1–5.1)
LYMPHOCYTES RELATIVE PERCENT: 12.8 %
MAGNESIUM: 1.7 MG/DL (ref 1.8–2.4)
MCH RBC QN AUTO: 36.5 PG (ref 26–34)
MCHC RBC AUTO-ENTMCNC: 34.4 G/DL (ref 31–36)
MCV RBC AUTO: 105.9 FL (ref 80–100)
MONOCYTES ABSOLUTE: 0.6 K/UL (ref 0–1.3)
MONOCYTES RELATIVE PERCENT: 8.2 %
NEUTROPHILS ABSOLUTE: 5.8 K/UL (ref 1.7–7.7)
NEUTROPHILS RELATIVE PERCENT: 76.2 %
PDW BLD-RTO: 14.1 % (ref 12.4–15.4)
PLATELET # BLD: 270 K/UL (ref 135–450)
PMV BLD AUTO: 7.3 FL (ref 5–10.5)
POTASSIUM REFLEX MAGNESIUM: 3 MMOL/L (ref 3.5–5.1)
RBC # BLD: 2.85 M/UL (ref 4.2–5.9)
SODIUM BLD-SCNC: 143 MMOL/L (ref 136–145)
WBC # BLD: 7.7 K/UL (ref 4–11)

## 2021-03-03 PROCEDURE — 2580000003 HC RX 258: Performed by: INTERNAL MEDICINE

## 2021-03-03 PROCEDURE — 85025 COMPLETE CBC W/AUTO DIFF WBC: CPT

## 2021-03-03 PROCEDURE — 2700000000 HC OXYGEN THERAPY PER DAY

## 2021-03-03 PROCEDURE — 83735 ASSAY OF MAGNESIUM: CPT

## 2021-03-03 PROCEDURE — 6360000002 HC RX W HCPCS: Performed by: HOSPITALIST

## 2021-03-03 PROCEDURE — 6360000002 HC RX W HCPCS: Performed by: INTERNAL MEDICINE

## 2021-03-03 PROCEDURE — 36415 COLL VENOUS BLD VENIPUNCTURE: CPT

## 2021-03-03 PROCEDURE — 94761 N-INVAS EAR/PLS OXIMETRY MLT: CPT

## 2021-03-03 PROCEDURE — 80048 BASIC METABOLIC PNL TOTAL CA: CPT

## 2021-03-03 RX ORDER — MORPHINE SULFATE 2 MG/ML
1 INJECTION, SOLUTION INTRAMUSCULAR; INTRAVENOUS EVERY 4 HOURS PRN
Qty: 1 VIAL | Refills: 0
Start: 2021-03-03 | End: 2021-03-06

## 2021-03-03 RX ORDER — MORPHINE SULFATE 2 MG/ML
1 INJECTION, SOLUTION INTRAMUSCULAR; INTRAVENOUS EVERY 4 HOURS PRN
Status: DISCONTINUED | OUTPATIENT
Start: 2021-03-03 | End: 2021-03-03 | Stop reason: HOSPADM

## 2021-03-03 RX ORDER — MORPHINE SULFATE 2 MG/ML
2 INJECTION, SOLUTION INTRAMUSCULAR; INTRAVENOUS ONCE
Status: COMPLETED | OUTPATIENT
Start: 2021-03-03 | End: 2021-03-03

## 2021-03-03 RX ORDER — LORAZEPAM 2 MG/ML
1 INJECTION INTRAMUSCULAR EVERY 4 HOURS PRN
Qty: 1 VIAL | Refills: 0
Start: 2021-03-03 | End: 2021-03-06

## 2021-03-03 RX ORDER — LORAZEPAM 2 MG/ML
1 INJECTION INTRAMUSCULAR EVERY 4 HOURS PRN
Status: DISCONTINUED | OUTPATIENT
Start: 2021-03-03 | End: 2021-03-03 | Stop reason: HOSPADM

## 2021-03-03 RX ORDER — LORAZEPAM 2 MG/ML
1 INJECTION INTRAMUSCULAR ONCE
Status: COMPLETED | OUTPATIENT
Start: 2021-03-03 | End: 2021-03-03

## 2021-03-03 RX ADMIN — LEVOFLOXACIN 500 MG: 5 INJECTION, SOLUTION INTRAVENOUS at 12:37

## 2021-03-03 RX ADMIN — Medication 10 ML: at 08:51

## 2021-03-03 RX ADMIN — LORAZEPAM 1 MG: 2 INJECTION, SOLUTION INTRAMUSCULAR; INTRAVENOUS at 16:24

## 2021-03-03 RX ADMIN — POTASSIUM CHLORIDE 10 MEQ: 7.46 INJECTION, SOLUTION INTRAVENOUS at 11:00

## 2021-03-03 RX ADMIN — LORAZEPAM 1 MG: 2 INJECTION, SOLUTION INTRAMUSCULAR; INTRAVENOUS at 13:51

## 2021-03-03 RX ADMIN — SODIUM CHLORIDE, PRESERVATIVE FREE 10 ML: 5 INJECTION INTRAVENOUS at 08:51

## 2021-03-03 RX ADMIN — POTASSIUM CHLORIDE 10 MEQ: 7.46 INJECTION, SOLUTION INTRAVENOUS at 09:50

## 2021-03-03 RX ADMIN — ENOXAPARIN SODIUM 40 MG: 40 INJECTION SUBCUTANEOUS at 08:50

## 2021-03-03 RX ADMIN — MORPHINE SULFATE 2 MG: 2 INJECTION, SOLUTION INTRAMUSCULAR; INTRAVENOUS at 16:24

## 2021-03-03 RX ADMIN — LORAZEPAM 2 MG: 2 INJECTION, SOLUTION INTRAMUSCULAR; INTRAVENOUS at 06:01

## 2021-03-03 RX ADMIN — DEXTROSE AND SODIUM CHLORIDE: 5; 900 INJECTION, SOLUTION INTRAVENOUS at 06:01

## 2021-03-03 RX ADMIN — POTASSIUM CHLORIDE 10 MEQ: 7.46 INJECTION, SOLUTION INTRAVENOUS at 11:57

## 2021-03-03 RX ADMIN — MORPHINE SULFATE 1 MG: 2 INJECTION, SOLUTION INTRAMUSCULAR; INTRAVENOUS at 12:37

## 2021-03-03 RX ADMIN — POTASSIUM CHLORIDE 10 MEQ: 7.46 INJECTION, SOLUTION INTRAVENOUS at 08:51

## 2021-03-03 ASSESSMENT — PAIN SCALES - GENERAL
PAINLEVEL_OUTOF10: 0
PAINLEVEL_OUTOF10: 0

## 2021-03-03 ASSESSMENT — ENCOUNTER SYMPTOMS: TACHYPNEA: 1

## 2021-03-03 NOTE — CONSULTS
Palliative Care Initial Note  Palliative Care Admit date:  3/3/21  Reason for c/s:  [facility]    Advance Directives:  Pt executed AD's and designated his dtr, Bi Chambers, as his healthcare proxy. Pt currently has a full code status. In d/w Radha, w/o provocation, she requested that pts code status be amended to dnr to better reflect the goal of comfort care. Plan of care/goals:  Spoke w/ Radha via phone. She was appropriately tearful and clear in her wish to shift the focus of care to comfort and involve the support of hospice. In light of pts dysphagia and lack of po intake, she reports they (family) do not intend to initiated artificial nutrition and anticipate that pts life expectancy will be very limited. Pt came here from [facility] and family would like his to be evaluated for the [facility]. Given pts known aspiration, dtr understands that pts respiratory status will need to be monitored to insure comfort. Writer provided education around home hospice vs IPU vs ecf and she understands that room & board would be paid OOP if pt were to go to an ecf. Social/Spiritual:   At the start of covid, family moved pt out of the [facility] so that he could have family oversight; he stayed w/ [facility] for 10 months. About a month ago, pt and dtr agreed he would move to [facility]. Plan:  HOC will contact Radha to arrange mtg for eval in the [address] vs her home.   Code status amended    ADDENDUM @ 3034:  [facility] meeting w/ HCPOA @ noon          Reason for consult:  ___ Advance Care Planning  _X_ Transition of Care Planning  _X_ Psychosocial/Spiritual Support  ___ Symptom Management                                                                                                                                                      Jagruti Sheppard RN

## 2021-03-03 NOTE — PROGRESS NOTES
Speech Language Pathology    SLP attempted evaluation, spoke with RN. RN reported pt / pt's family pursuing hospice care at this time, ST to sign-off. Please re-refer ST if changes occur.     Namrata Berman M.S. Bebe Zleaya  Speech-language pathologist  [phone]

## 2021-03-03 NOTE — PROGRESS NOTES
Pt discharged to hospice per order. IV left in place, 2 mg morphine and 1 mg ativan given IV prior to discharge to hospice. Full report given to transport.

## 2021-03-03 NOTE — DISCHARGE SUMMARY
Hospital Medicine Discharge Summary    Patient ID: Steve Mora      Patient's PCP: Carlee Gaucher, MD    Admit Date: 2/26/2021     Discharge Date:   3/3/2021  Admitting Physician: Rut Wallace MD     Discharge Physician: Gary Potts MD     Discharge Diagnoses: Active Hospital Problems    Diagnosis    Acute metabolic encephalopathy [Y70.69]     Priority: High    Pneumonia [J18.9]     Priority: High    Delirium [R41.0]    Chest pain [R07.9]    Acquired hypothyroidism [E03.9]    COPD (chronic obstructive pulmonary disease) (HCC) [J44.9]    Hypokalemia [E87.6]    Elevated troponin [R77.8]    Sepsis (HCC) [A41.9]    Tachycardia [R00.0]    Tachypnea [R10.55]    Neutrophilic leukocytosis [D64.7]    Chronic diastolic congestive heart failure (HCC) [I50.32]    Pericardial effusion [I31.3]       The patient was seen and examined on day of discharge and this discharge summary is in conjunction with any daily progress note from day of discharge. Hospital Course: 80 y. o. year-old male PMH COPD, Hypothyroidism, Glaucoma, Macular degeneration and BPH presents from his nursing home for evaluation following an acute onset of sharp, stabbing pain in his left chest wall that radiates to his left arm. The pain is present constantly and is worse when he takes a deep breath. In the ER he was found to have Pneumonia, associated sepsis and an elevated troponin. His EKG had no ischemic changes. CT CAP shows cirrhosis, left upper lobe pneumonia with an associated left pleural effusion , atrophic kidneys and pericardial effusion. Small pericardial effusion without tamponade seen on cardiac echo. Chest pain was thought to be likely due to pleurisy from pneumonia  with ACS ruled out per cardio. Pt was treated with abx for his pneumonia. Had urinary retention with preston placed by urology. His hospitalization was complicated by severe delirium which minimally improved with supportive care and psychiatry involvement. Pt noted to be aspirating with meds and NPO recommended by SLP. Discussions were held with family and they decided to change code status to [facility] with overall goal of comfort care only  and requested palliative care with hospice. Oral meds and abx d/ryan. Comfort care meds initiated with IV morphine and ativan. He will be discharged to IP hospice. Physical Exam Performed:     /62   Pulse 90   Temp 98.2 °F (36.8 °C) (Axillary)   Resp 20   Ht 5' 11\" (1.803 m)   Wt 126 lb 5.2 oz (57.3 kg)   SpO2 95%   BMI 17.62 kg/m²     General appearance: elderly  male,  Somnolent  HEENT: Pupils equal, round,  Bilaterally blind ( chronic), [place] - uses hearing aids but does not have them on  Neck: Supple, range of motion. No jugular venous distention. Trachea midline. Respiratory:  Normal effort. Bibasilar rales with no overt wheezes  Cardiovascular: Regular rate and rhythm with normal S1/S2 without murmurs, rubs or gallops. Abdomen: Soft, non-tender, non-distended with normal bowel sounds. Musculoskeletal: No clubbing, cyanosis or edema bilaterally. Skin:warm and dry   Neurologic:  Limited due to somnolence and inabilty to follow commands, blind and [place]. Moving extremities 'Psychiatric: sleepy         Labs: For convenience and continuity at follow-up the following most recent labs are provided:      CBC:    Lab Results   Component Value Date    WBC 7.7 03/03/2021    HGB 10.4 03/03/2021    HCT 30.1 03/03/2021     03/03/2021       Renal:    Lab Results   Component Value Date     03/03/2021    K 3.0 03/03/2021     03/03/2021    CO2 21 03/03/2021    BUN 17 03/03/2021    CREATININE 0.9 03/03/2021    CALCIUM 8.9 03/03/2021         Significant Diagnostic Studies    Radiology:   CT HEAD WO CONTRAST   Final Result   No acute intracranial abnormality. Diffuse atrophic changes with findings suggesting chronic microvascular   ischemia         CTA HEAD NECK W CONTRAST   Final Result   1. Bilateral internal carotid artery cavernous segment scattered   atherosclerotic calcification resulting up to approximately bilateral 20%   arterial stenosis. Finding is compatible with 16-49% stenosis per   sonographic NASCET index criteria. 2. Bilateral carotid bifurcation atherosclerotic plaque resulting in   approximately 30% stenosis of the left internal carotid artery origin. Finding is compatible with 16-49% stenosis per sonographic NASCET index   criteria. 3. Incidental finding of severe centrilobular emphysema. RECOMMENDATIONS:   For clinical suspicion of acute ischemia, recommend MRI brain with   diffusion-weighted imaging for further evaluation         CT CHEST ABDOMEN PELVIS W CONTRAST   Final Result   1. Left upper lobe pneumonia. Follow-up radiographs are recommended to   ensure resolution. There is an associated trace left pleural effusion. 2. Cirrhosis. 3. Cholelithiasis. 4. Atrophic kidneys. 5. Atherosclerotic disease. 6. Pericardial effusion. XR CHEST PORTABLE   Final Result   1. Left mid lung infiltrate, concerning for pneumonia. Follow-up radiographs   are recommended to ensure resolution. 2. Emphysema. cardiac echo :      Findings      Left Ventricle   Low normal LV Function, cannot exclude anteroseptal wall motion abnormality      Pericardial Effusion   Small circumferential pericardial effusion without tamponade physiology. Miscellaneous   Limited for pericardial effusion. Consults:     PHARMACY TO DOSE VANCOMYCIN  IP CONSULT TO CARDIOLOGY  IP CONSULT TO PALLIATIVE CARE  IP CONSULT TO UROLOGY  IP CONSULT TO PSYCHIATRY  IP CONSULT TO SOCIAL WORK  IP CONSULT TO HOSPICE    Disposition:  IP      Condition at Discharge: Stable    Discharge Instructions/Follow-up:  None as discharging with hospice     Code Status:  DNR-CC     Activity: activity as tolerated    Diet: NPO recommended per SLP but ok to do dysphagia diet for comfort feeding       Discharge Medications:     Current Discharge Medication List           Details   Morphine Sulfate (MORPHINE, PF,) 2 MG/ML SOLN injection Infuse 0.5 mLs intravenously every 4 hours as needed (pain) for up to 3 days. Qty: 1 vial, Refills: 0    Comments: Reduce doses taken as pain becomes manageable  Associated Diagnoses: Pneumonia due to organism      LORazepam (ATIVAN) 2 MG/ML injection Infuse 0.5 mLs intravenously every 4 hours as needed (anxiety) for up to 3 days. Qty: 1 vial, Refills: 0    Associated Diagnoses: Pneumonia due to organism              Details   Fluticasone furoate-vilanterol (BREO ELLIPTA) 200-25 MCG/INH AEPB inhaler Inhale 1 puff into the lungs daily      Umeclidinium Bromide (INCRUSE ELLIPTA) 62.5 MCG/INH AEPB Inhale into the lungs             Time Spent on discharge is more than 30 minutes in the examination, evaluation, counseling and review of medications and discharge plan.       Signed:

## 2021-03-03 NOTE — PROGRESS NOTES
Physical Therapy  Pt transferring to hospice care and is no longer appropriate for therapy services. Will sign off at this time.      Young Libman, PT, DPT  Danay Vazquez, OT

## 2021-03-03 NOTE — PROGRESS NOTES
Daughter stated to RN that code status for the patient would be addressed in the morning and hospice consult would be needed.

## 2021-03-03 NOTE — DISCHARGE INSTR - COC
Continuity of Care Form    Patient Name: Dong Degroot   :  1929  MRN:  [id number]    Admit date:  2021  Discharge date:  ***    Code Status Order: DNR-CC   Advance Directives:     Admitting Physician:  Marycarmen Glover MD  PCP: Lawanda Luis MD    Discharging Nurse: [facility] Unit/Room#: 0587/2729-56  Discharging Unit Phone Number: ***    Emergency Contact:   Extended Emergency Contact Information  Primary Emergency Contact: Renato Molina  Address: [address] [place] [address] Phone: [phone]  Work Phone: [phone]  Mobile Phone: [phone]  Relation: Child  Secondary Emergency Contact: Alesia Pino  Address: [address] [place] [address] ID8-Mobile Phone: [phone]  Relation: Child    Past Surgical History:  Past Surgical History:   Procedure Laterality Date    BACK SURGERY      insertion of cement due to crushed vertebrae    EYE SURGERY Bilateral     cataracts    EYE SURGERY Right 14    [place]    TONSILLECTOMY      UPPER GASTROINTESTINAL ENDOSCOPY  2017    dilated esophagus       Immunization History: There is no immunization history on file for this patient.     Active Problems:  Patient Active Problem List   Diagnosis Code    Unable to ambulate R26.2    Back pain M54.9    Back pain M54.9    Acute renal failure (ARF) ([facility]) N17.9    COPD exacerbation ([facility]) J44.1    Chest pain R07.9    Acquired hypothyroidism E03.9    COPD (chronic obstructive pulmonary disease) ([facility]) J44.9    Hypokalemia E87.6    Elevated troponin R77.8    Sepsis ([facility]) A41.9    Tachycardia R00.0    Tachypnea X34.80    Neutrophilic leukocytosis N07.8    Pneumonia J18.9    Chronic diastolic congestive heart failure ([facility]) I50.32    Pericardial effusion I31.3    Acute metabolic encephalopathy N35.05    Delirium R41.0       Isolation/Infection:   Isolation          No Isolation        Patient Infection Status Infection Onset Added Last Indicated Last Indicated By Review Planned Expiration Resolved Resolved By    None active    Resolved    COVID-19 Rule Out 02/26/21 02/26/21 02/26/21 COVID-19, Rapid (Ordered)   02/26/21 Rule-Out Test Resulted          Nurse Assessment:  Last Vital Signs: /62   Pulse 90   Temp 98.2 °F (36.8 °C) (Axillary)   Resp 20   Ht 5' 11\" (1.803 m)   Wt 126 lb 5.2 oz (57.3 kg)   SpO2 95%   BMI 17.62 kg/m²     Last documented pain score (0-10 scale): Pain Level: 0  Last Weight:   Wt Readings from Last 1 Encounters:   03/03/21 126 lb 5.2 oz (57.3 kg)     Mental Status:  {IP PT MENTAL STATUS:20030}    IV Access:  { LESLEY IV ACCESS:517275480}    Nursing Mobility/ADLs:  Walking   {CHP DME QJHB:589950954}  Transfer  {CHP DME UJNV:586737855}  Bathing  {CHP DME QEAM:051310899}  Dressing  {CHP DME OUEP:812353135}  Toileting  {CHP DME WKKP:480828385}  Feeding  {CHP DME BVXE:023924201}  Med Admin  {P DME IPUZ:682093467}  Med Delivery   { LESLEY MED Delivery:112313293}    Wound Care Documentation and Therapy:        Elimination:  Continence:   · Bowel: {YES / DL:00784}  · Bladder: {YES / ND:45063}  Urinary Catheter: {Urinary Catheter:186679944}   Colostomy/Ileostomy/Ileal Conduit: {YES / FM:69725}       Date of Last BM: ***    Intake/Output Summary (Last 24 hours) at 3/3/2021 1332  Last data filed at 3/3/2021 0541  Gross per 24 hour   Intake 0 ml   Output 400 ml   Net -400 ml     I/O last 3 completed shifts:   In: 0   Out: 500 [Urine:500]    Safety Concerns:     [address] [employer] Safety Concerns:137111199}    Impairments/Disabilities:      [address] [employer] Impairments/Disabilities:152310660}    Nutrition Therapy:  Current Nutrition Therapy:   [employer] Diet List:438684152}    Routes of Feeding: {CHP DME Other Feedings:671224173}  Liquids: {Slp liquid thickness:63035}  Daily Fluid Restriction: {CHP DME Yes amt example:493510060} Last Modified Barium Swallow with Video (Video Swallowing Test): {Done Not Done ORUB:583812461}    Treatments at the Time of Hospital Discharge:   Respiratory Treatments: ***  Oxygen Therapy:  {Therapy; copd oxygen:28734}  Ventilator:    {[facility] Vent ESBW:860781588}    Rehab Therapies: {THERAPEUTIC INTERVENTION:8159086906}  Weight Bearing Status/Restrictions: { CC Weight Bearin}  Other Medical Equipment (for information only, NOT a DME order):  {EQUIPMENT:608441890}  Other Treatments: ***    Patient's personal belongings (please select all that are sent with patient):  {[facility] DME Belongings:560969139}    RN SIGNATURE:  {Esignature:597512359}    CASE MANAGEMENT/SOCIAL WORK SECTION    Inpatient Status Date: ***    Readmission Risk Assessment Score:  Readmission Risk              Risk of Unplanned Readmission:        13           Discharging to Facility/ Agency   · Name:   · Address:  · Phone:  · Fax:    Dialysis Facility (if applicable)   · Name:  · Address:  · Dialysis Schedule:  · Phone:  · Fax:    / signature: {Esignature:084688761}    PHYSICIAN SECTION    Prognosis: {Prognosis:1631529991}    Condition at Discharge: [facility] Patient Condition:366447276}    Rehab Potential (if transferring to Rehab): {Prognosis:9737995824}    Recommended Labs or Other Treatments After Discharge: ***    Physician Certification: I certify the above information and transfer of Corrine Espinoza  is necessary for the continuing treatment of the diagnosis listed and that he requires {Admit to Appropriate Level of Care:76845} for {GREATER/LESS:306591149} 30 days.      Update Admission H&P: {CHP DME Changes in LJKYS:566972043}    PHYSICIAN SIGNATURE:  {Esignature:085023152}

## 2021-03-28 PROBLEM — R77.8 ELEVATED TROPONIN: Status: RESOLVED | Noted: 2021-02-26 | Resolved: 2021-03-28

## 2021-08-25 NOTE — PROGRESS NOTES
Basilio Richmond MD your patient has been admitted to the [facility] in [place] on Admit Date: 8/25/2021 Admit Time: 1426  . Please contact [phone] with any questions. Thank you.     Shift assessment completed and charted. VSS. RA stating 94%. Lungs clear and diminished. No complaints of heart burn. Swallowed PO pills with no complaints or problems. Gabriel draining chelsey urine, slow to drain. Bed alarm on. Bed locked and in lowest position. Call light within reach. Pt denies any other needs at this time. Will continue to monitor.